# Patient Record
Sex: FEMALE | Race: WHITE | HISPANIC OR LATINO | Employment: OTHER | URBAN - METROPOLITAN AREA
[De-identification: names, ages, dates, MRNs, and addresses within clinical notes are randomized per-mention and may not be internally consistent; named-entity substitution may affect disease eponyms.]

---

## 2020-07-30 ENCOUNTER — APPOINTMENT (OUTPATIENT)
Dept: GENERAL RADIOLOGY | Facility: HOSPITAL | Age: 45
End: 2020-07-30

## 2020-07-30 ENCOUNTER — HOSPITAL ENCOUNTER (EMERGENCY)
Facility: HOSPITAL | Age: 45
Discharge: HOME | End: 2020-07-30
Attending: EMERGENCY MEDICINE | Admitting: EMERGENCY MEDICINE

## 2020-07-30 VITALS
HEIGHT: 63 IN | BODY MASS INDEX: 35.44 KG/M2 | TEMPERATURE: 99.7 F | HEART RATE: 93 BPM | WEIGHT: 200 LBS | SYSTOLIC BLOOD PRESSURE: 149 MMHG | RESPIRATION RATE: 22 BRPM | OXYGEN SATURATION: 95 % | DIASTOLIC BLOOD PRESSURE: 90 MMHG

## 2020-07-30 DIAGNOSIS — B34.8 RHINOVIRUS INFECTION: Primary | ICD-10-CM

## 2020-07-30 LAB
B PARAPERT DNA SPEC QL NAA+PROBE: NOT DETECTED
B PERT DNA SPEC QL NAA+PROBE: NOT DETECTED
C PNEUM DNA NPH QL NAA+NON-PROBE: NOT DETECTED
FLUAV H1 2009 PAND RNA NPH QL NAA+PROBE: NOT DETECTED
FLUAV H1 HA GENE NPH QL NAA+PROBE: NOT DETECTED
FLUAV H3 RNA NPH QL NAA+PROBE: NOT DETECTED
FLUAV SUBTYP SPEC NAA+PROBE: NOT DETECTED
FLUBV RNA ISLT QL NAA+PROBE: NOT DETECTED
HADV DNA SPEC NAA+PROBE: NOT DETECTED
HCOV 229E RNA SPEC QL NAA+PROBE: NOT DETECTED
HCOV HKU1 RNA SPEC QL NAA+PROBE: NOT DETECTED
HCOV NL63 RNA SPEC QL NAA+PROBE: NOT DETECTED
HCOV OC43 RNA SPEC QL NAA+PROBE: NOT DETECTED
HMPV RNA NPH QL NAA+NON-PROBE: NOT DETECTED
HPIV1 RNA SPEC QL NAA+PROBE: NOT DETECTED
HPIV2 RNA SPEC QL NAA+PROBE: NOT DETECTED
HPIV3 RNA NPH QL NAA+PROBE: NOT DETECTED
HPIV4 P GENE NPH QL NAA+PROBE: NOT DETECTED
M PNEUMO IGG SER IA-ACNC: NOT DETECTED
RHINOVIRUS RNA SPEC NAA+PROBE: DETECTED
RSV RNA NPH QL NAA+NON-PROBE: NOT DETECTED
SARS-COV-2 RNA NPH QL NAA+NON-PROBE: NOT DETECTED

## 2020-07-30 PROCEDURE — 0202U NFCT DS 22 TRGT SARS-COV-2: CPT | Performed by: PHYSICIAN ASSISTANT

## 2020-07-30 PROCEDURE — 99283 EMERGENCY DEPT VISIT LOW MDM: CPT

## 2020-07-30 PROCEDURE — 71045 X-RAY EXAM CHEST 1 VIEW: CPT

## 2020-07-30 RX ORDER — TIZANIDINE 4 MG/1
TABLET ORAL
COMMUNITY
Start: 2018-11-04

## 2020-07-30 RX ORDER — TADALAFIL 20 MG/1
40 TABLET ORAL
COMMUNITY

## 2020-07-30 RX ORDER — IBUPROFEN 600 MG/1
600 TABLET ORAL
COMMUNITY

## 2020-07-30 RX ORDER — BENZONATATE 100 MG/1
100-200 CAPSULE ORAL EVERY 8 HOURS PRN
Qty: 30 CAPSULE | Refills: 0 | Status: SHIPPED | OUTPATIENT
Start: 2020-07-30 | End: 2022-06-27 | Stop reason: SDUPTHER

## 2020-07-30 RX ORDER — PANTOPRAZOLE SODIUM 40 MG/1
40 TABLET, DELAYED RELEASE ORAL DAILY
COMMUNITY
Start: 2015-09-03

## 2020-07-30 RX ORDER — HYDROCODONE BITARTRATE AND ACETAMINOPHEN 7.5; 325 MG/1; MG/1
1 TABLET ORAL
COMMUNITY

## 2020-07-30 RX ORDER — FERROUS SULFATE 325(65) MG
325 TABLET ORAL DAILY
COMMUNITY

## 2020-07-31 ENCOUNTER — PATIENT OUTREACH (OUTPATIENT)
Dept: CASE MANAGEMENT | Facility: OTHER | Age: 45
End: 2020-07-31

## 2020-07-31 NOTE — OUTREACH NOTE
"ED Potential Covid Discharge Follow-up    Patient reports she is still feeling \"bad\" but slightly better than she felt yesterday. She has not taken her temperature today \"I don't feel hot anymore\" Pt. Continues to have a cough and has not picked up her new prescription for Tessalon pearls. Education done on need to continue to take her temperature and record, stay hydrated and rest. Pt. Knows to call PCP for f/u visit or any needs. Pt. Is aware she is positive for rhino virus and precautions to avoid contact with others. Reviewed CDC COVID-19 recommendations for quarantine and social distancing. Patient knows to wear a mask when leaving their home. Patient has no barriers to food and transportation at this time.Explained role of Care Advisor and contact information given to patient.Patient given phone number to the 24/7 Nurse Line ([phone]) & COVID-19 Hotline ([phone])       Sravanthi Deng RN  Ambulatory     7/31/2020, 12:41      "

## 2020-07-31 NOTE — OUTREACH NOTE
Care Coordination Note    Notified Dr. Tidwell's office of patient being + for rhinovirus and negative for Covid 19. Spoke with Sonia.     Sravanthi Deng RN  Ambulatory     7/31/2020, 12:45

## 2020-08-03 ENCOUNTER — EPISODE CHANGES (OUTPATIENT)
Dept: CASE MANAGEMENT | Facility: OTHER | Age: 45
End: 2020-08-03

## 2021-02-17 ENCOUNTER — APPOINTMENT (OUTPATIENT)
Dept: CT IMAGING | Facility: HOSPITAL | Age: 46
End: 2021-02-17

## 2021-02-17 ENCOUNTER — HOSPITAL ENCOUNTER (EMERGENCY)
Facility: HOSPITAL | Age: 46
Discharge: HOME | End: 2021-02-17
Attending: EMERGENCY MEDICINE | Admitting: EMERGENCY MEDICINE

## 2021-02-17 VITALS
RESPIRATION RATE: 16 BRPM | HEART RATE: 86 BPM | BODY MASS INDEX: 35.26 KG/M2 | WEIGHT: 199 LBS | OXYGEN SATURATION: 93 % | TEMPERATURE: 97.3 F | SYSTOLIC BLOOD PRESSURE: 130 MMHG | HEIGHT: 63 IN | DIASTOLIC BLOOD PRESSURE: 79 MMHG

## 2021-02-17 DIAGNOSIS — R04.2 HEMOPTYSIS: Primary | ICD-10-CM

## 2021-02-17 DIAGNOSIS — R04.89 PULMONARY ALVEOLAR HEMORRHAGE: ICD-10-CM

## 2021-02-17 DIAGNOSIS — Z86.79 HISTORY OF PULMONARY HYPERTENSION: ICD-10-CM

## 2021-02-17 LAB
ALBUMIN SERPL-MCNC: 4.2 G/DL (ref 3.5–5.2)
ALBUMIN/GLOB SERPL: 1.3 G/DL
ALP SERPL-CCNC: 53 U/L (ref 39–117)
ALT SERPL W P-5'-P-CCNC: 13 U/L (ref 1–33)
ANION GAP SERPL CALCULATED.3IONS-SCNC: 10 MMOL/L (ref 5–15)
APTT PPP: 29.1 SECONDS (ref 22.7–35.4)
AST SERPL-CCNC: 10 U/L (ref 1–32)
BASOPHILS # BLD AUTO: 0.03 10*3/MM3 (ref 0–0.2)
BASOPHILS NFR BLD AUTO: 0.6 % (ref 0–1.5)
BILIRUB SERPL-MCNC: 0.4 MG/DL (ref 0–1.2)
BUN SERPL-MCNC: 13 MG/DL (ref 6–20)
BUN/CREAT SERPL: 15.9 (ref 7–25)
CALCIUM SPEC-SCNC: 9 MG/DL (ref 8.6–10.5)
CHLORIDE SERPL-SCNC: 104 MMOL/L (ref 98–107)
CO2 SERPL-SCNC: 23 MMOL/L (ref 22–29)
CREAT SERPL-MCNC: 0.82 MG/DL (ref 0.57–1)
DEPRECATED RDW RBC AUTO: 40.2 FL (ref 37–54)
EOSINOPHIL # BLD AUTO: 0.08 10*3/MM3 (ref 0–0.4)
EOSINOPHIL NFR BLD AUTO: 1.5 % (ref 0.3–6.2)
ERYTHROCYTE [DISTWIDTH] IN BLOOD BY AUTOMATED COUNT: 13.1 % (ref 12.3–15.4)
GFR SERPL CREATININE-BSD FRML MDRD: 76 ML/MIN/1.73
GLOBULIN UR ELPH-MCNC: 3.2 GM/DL
GLUCOSE SERPL-MCNC: 98 MG/DL (ref 65–99)
HCG SERPL QL: NEGATIVE
HCT VFR BLD AUTO: 37.6 % (ref 34–46.6)
HGB BLD-MCNC: 13.3 G/DL (ref 12–15.9)
IMM GRANULOCYTES # BLD AUTO: 0.01 10*3/MM3 (ref 0–0.05)
IMM GRANULOCYTES NFR BLD AUTO: 0.2 % (ref 0–0.5)
INR PPP: 0.96 (ref 0.9–1.1)
LYMPHOCYTES # BLD AUTO: 1.72 10*3/MM3 (ref 0.7–3.1)
LYMPHOCYTES NFR BLD AUTO: 32.8 % (ref 19.6–45.3)
MCH RBC QN AUTO: 30.4 PG (ref 26.6–33)
MCHC RBC AUTO-ENTMCNC: 35.4 G/DL (ref 31.5–35.7)
MCV RBC AUTO: 86 FL (ref 79–97)
MONOCYTES # BLD AUTO: 0.32 10*3/MM3 (ref 0.1–0.9)
MONOCYTES NFR BLD AUTO: 6.1 % (ref 5–12)
NEUTROPHILS NFR BLD AUTO: 3.09 10*3/MM3 (ref 1.7–7)
NEUTROPHILS NFR BLD AUTO: 58.8 % (ref 42.7–76)
NRBC BLD AUTO-RTO: 0 /100 WBC (ref 0–0.2)
PLATELET # BLD AUTO: 185 10*3/MM3 (ref 140–450)
PMV BLD AUTO: 11.1 FL (ref 6–12)
POTASSIUM SERPL-SCNC: 3.6 MMOL/L (ref 3.5–5.2)
PROT SERPL-MCNC: 7.4 G/DL (ref 6–8.5)
PROTHROMBIN TIME: 12.6 SECONDS (ref 11.7–14.2)
RBC # BLD AUTO: 4.37 10*6/MM3 (ref 3.77–5.28)
SODIUM SERPL-SCNC: 137 MMOL/L (ref 136–145)
WBC # BLD AUTO: 5.25 10*3/MM3 (ref 3.4–10.8)

## 2021-02-17 PROCEDURE — 85730 THROMBOPLASTIN TIME PARTIAL: CPT | Performed by: EMERGENCY MEDICINE

## 2021-02-17 PROCEDURE — 80053 COMPREHEN METABOLIC PANEL: CPT | Performed by: EMERGENCY MEDICINE

## 2021-02-17 PROCEDURE — 85025 COMPLETE CBC W/AUTO DIFF WBC: CPT | Performed by: EMERGENCY MEDICINE

## 2021-02-17 PROCEDURE — 85610 PROTHROMBIN TIME: CPT | Performed by: EMERGENCY MEDICINE

## 2021-02-17 PROCEDURE — 25010000002 IOPAMIDOL 61 % SOLUTION: Performed by: EMERGENCY MEDICINE

## 2021-02-17 PROCEDURE — 71260 CT THORAX DX C+: CPT

## 2021-02-17 PROCEDURE — 84703 CHORIONIC GONADOTROPIN ASSAY: CPT | Performed by: EMERGENCY MEDICINE

## 2021-02-17 PROCEDURE — 99283 EMERGENCY DEPT VISIT LOW MDM: CPT

## 2021-02-17 RX ADMIN — IOPAMIDOL 100 ML: 612 INJECTION, SOLUTION INTRAVENOUS at 21:04

## 2021-02-17 NOTE — ED NOTES
Pt states she has been coughing up blood.    Denies blood thinners.  Mask placed on patient in triage.  Triage RN wearing mask throughout encounter.       Juaquin Patricia, RN  02/17/21 [id number]

## 2021-02-18 NOTE — ED NOTES
"Patient notes last night she coughed up blood, that was dark red and small 1/2 dime in size.  Patient states \"after I coughed up last night, I felt so much better, but then it happened again today so I got really concerned and came in.\"  Denies any blood thinners, notes hx of pulmonary edema and shortness of breath is normal for her to have.  Patient is anxious and tearful but cooperative with care.  ABC's intact, VSS.  NAD noted.  Patient wearing mask, nurse wearing mask and protective eyewear during care and assessment.  Hand hygiene performed prior to and post care.      Juan A Clemente RN  02/17/21 1948    "

## 2021-02-18 NOTE — ED NOTES
Stuck patient x2 for IV and twice for blood.  Unable to obtain blood or IV.  IV therapy paged at 1944.     Juan A Clemente RN  02/17/21 1946

## 2021-02-18 NOTE — ED PROVIDER NOTES
EMERGENCY DEPARTMENT ENCOUNTER    Room Number:    Date of encounter:  2021  PCP: Rm Tidwell MD  Historian: Patient      HPI:  Chief Complaint: Hemoptysis  A complete HPI/ROS/PMH/PSH/SH/FH are unobtainable due to: None    Context: Cleo Menon is a 45 y.o. female who presents to the ED via private vehicle for two episodes of coughing up blood, first one came last night, second one was today.  Denies any dizziness, hands, shortness breath, chest pain, nausea, vomiting or diarrhea.  Reports feeling completely normal at this time.  She reports a history of pulmonary hypertension with normal oxygen saturations in the low 90s on room air at home.  This is normal for her.  Has never had any hemoptysis before.  Denies any recent travel or recent surgery, denies any recent illnesses including no fevers, chills, sinusitis.      MEDICAL RECORD REVIEW    Chart review in epic shows no evidence of anticoagulation.    PAST MEDICAL HISTORY  Active Ambulatory Problems     Diagnosis Date Noted   • No Active Ambulatory Problems     Resolved Ambulatory Problems     Diagnosis Date Noted   • No Resolved Ambulatory Problems     Past Medical History:   Diagnosis Date   • Enlarged heart    • Hypertension    • Pulmonary hypertension (CMS/HCC)          PAST SURGICAL HISTORY  Past Surgical History:   Procedure Laterality Date   •  SECTION     • CHOLECYSTECTOMY     • LUMBAR SPINAL CORD TUMOR REMOVAL     • VSD REPAIR           FAMILY HISTORY  History reviewed. No pertinent family history.      SOCIAL HISTORY  Social History     Socioeconomic History   • Marital status:      Spouse name: Not on file   • Number of children: Not on file   • Years of education: Not on file   • Highest education level: Not on file   Tobacco Use   • Smoking status: Never Smoker   • Smokeless tobacco: Never Used   Substance and Sexual Activity   • Alcohol use: Never     Frequency: Never   • Drug use: Never   • Sexual activity: Defer          ALLERGIES  Patient has no known allergies.        REVIEW OF SYSTEMS  Review of Systems     All systems reviewed and negative except for those discussed in HPI.       PHYSICAL EXAM    I have reviewed the triage vital signs and nursing notes.    ED Triage Vitals   Temp Heart Rate Resp BP SpO2   02/17/21 1856 02/17/21 1856 02/17/21 1856 02/17/21 1903 02/17/21 1856   97.3 °F (36.3 °C) 108 18 173/95 95 %      Temp src Heart Rate Source Patient Position BP Location FiO2 (%)   02/17/21 1856 -- 02/17/21 1903 02/17/21 1903 --   Tympanic  Lying Right arm        Physical Exam  General: Awake, alert, no acute distress  HEENT: Mucous membranes moist, atraumatic, EOMI, no blood in oropharynx or at the nares  Neck: Full ROM  Pulm: Symmetric chest rise, nonlabored, lungs CTAB  Cardiovascular: Regular rate and rhythm, intact distal pulses  GI: Soft, nontender, nondistended, no rebound, no guarding, bowel sounds present  MSK: Full ROM, no deformity  Skin: Warm, dry  Neuro: Alert and oriented x 3, GCS 15, moving all extremities, no focal deficits  Psych: Calm, cooperative      Surgical mask, protective eye goggles, and gloves used during this encounter. Patient in surgical mask.      LAB RESULTS  Recent Results (from the past 24 hour(s))   Comprehensive Metabolic Panel    Collection Time: 02/17/21  8:02 PM    Specimen: Blood   Result Value Ref Range    Glucose 98 65 - 99 mg/dL    BUN 13 6 - 20 mg/dL    Creatinine 0.82 0.57 - 1.00 mg/dL    Sodium 137 136 - 145 mmol/L    Potassium 3.6 3.5 - 5.2 mmol/L    Chloride 104 98 - 107 mmol/L    CO2 23.0 22.0 - 29.0 mmol/L    Calcium 9.0 8.6 - 10.5 mg/dL    Total Protein 7.4 6.0 - 8.5 g/dL    Albumin 4.20 3.50 - 5.20 g/dL    ALT (SGPT) 13 1 - 33 U/L    AST (SGOT) 10 1 - 32 U/L    Alkaline Phosphatase 53 39 - 117 U/L    Total Bilirubin 0.4 0.0 - 1.2 mg/dL    eGFR Non African Amer 76 >60 mL/min/1.73    Globulin 3.2 gm/dL    A/G Ratio 1.3 g/dL    BUN/Creatinine Ratio 15.9 7.0 - 25.0    Anion  Gap 10.0 5.0 - 15.0 mmol/L   Protime-INR    Collection Time: 02/17/21  8:02 PM    Specimen: Blood   Result Value Ref Range    Protime 12.6 11.7 - 14.2 Seconds    INR 0.96 0.90 - 1.10   aPTT    Collection Time: 02/17/21  8:02 PM    Specimen: Blood   Result Value Ref Range    PTT 29.1 22.7 - 35.4 seconds   CBC Auto Differential    Collection Time: 02/17/21  8:02 PM    Specimen: Blood   Result Value Ref Range    WBC 5.25 3.40 - 10.80 10*3/mm3    RBC 4.37 3.77 - 5.28 10*6/mm3    Hemoglobin 13.3 12.0 - 15.9 g/dL    Hematocrit 37.6 34.0 - 46.6 %    MCV 86.0 79.0 - 97.0 fL    MCH 30.4 26.6 - 33.0 pg    MCHC 35.4 31.5 - 35.7 g/dL    RDW 13.1 12.3 - 15.4 %    RDW-SD 40.2 37.0 - 54.0 fl    MPV 11.1 6.0 - 12.0 fL    Platelets 185 140 - 450 10*3/mm3    Neutrophil % 58.8 42.7 - 76.0 %    Lymphocyte % 32.8 19.6 - 45.3 %    Monocyte % 6.1 5.0 - 12.0 %    Eosinophil % 1.5 0.3 - 6.2 %    Basophil % 0.6 0.0 - 1.5 %    Immature Grans % 0.2 0.0 - 0.5 %    Neutrophils, Absolute 3.09 1.70 - 7.00 10*3/mm3    Lymphocytes, Absolute 1.72 0.70 - 3.10 10*3/mm3    Monocytes, Absolute 0.32 0.10 - 0.90 10*3/mm3    Eosinophils, Absolute 0.08 0.00 - 0.40 10*3/mm3    Basophils, Absolute 0.03 0.00 - 0.20 10*3/mm3    Immature Grans, Absolute 0.01 0.00 - 0.05 10*3/mm3    nRBC 0.0 0.0 - 0.2 /100 WBC   hCG, Serum, Qualitative    Collection Time: 02/17/21  8:02 PM    Specimen: Blood   Result Value Ref Range    HCG Qualitative Negative Negative       Ordered the above labs and independently reviewed the results.        RADIOLOGY  Ct Chest With Contrast Diagnostic    Result Date: 2/17/2021  Patient: JEFF MCCAULEY  Time Out: 21:55 Exam(s): CT CHEST With Contrast EXAM:   CT Chest With Intravenous Contrast CLINICAL HISTORY:    Reason for exam: hemoptysis. TECHNIQUE:   Axial computed tomography images of the chest with intravenous contrast.   CTDI is 22.2 mGy and DLP is 945.0 mGy-cm.  This CT exam was performed according to the principle of ALARA (As Low As  Reasonably Achievable) by using one or more of the following dose reduction techniques: automated exposure control, adjustment of the mA and or kV according to patient size, and or use of iterative reconstruction technique. COMPARISON:   No relevant prior studies available. FINDINGS:   Lungs:  Focal area of groundglass opacity within the right upper lobe which may be infectious.  Diffuse scattered pulmonary nodules all measuring less than 6 mm.   Pleural space:  Unremarkable.  No pneumothorax.  No significant effusion.   Heart:  Heart is enlarged.  No significant pericardial effusion.   Mediastinum:  Subcentimeter mediastinal and hilar lymph nodes, likely reactive.   Bones joints:  Unremarkable.  No acute fracture.  No dislocation.   Soft tissues:  Unremarkable.   Vasculature:  Enlarged main pulmonary artery measuring up to 4.4 cm which can be seen setting of prominent arterial hypertension.  No pulmonary embolus.  No thoracic aortic dissection or aneurysm.   Lymph nodes:  Unremarkable.   Liver:  Hepatic steatosis.   Gallbladder and bile ducts:  Gallbladder is surgically absent.   Kidneys and ureters:  Probable cyst within the right kidney. IMPRESSION:     1.  Enlarged main pulmonary artery measuring up to 4.4 cm which can be seen setting of prominent arterial hypertension.  No pulmonary embolus. 2.  No thoracic aortic dissection or aneurysm. 3.  Focal area of groundglass opacity within the right upper lobe which may be infectious.  Consider short interval follow-up after appropriate treatment.     Electronically signed by Akira Chun MD on 02-17-21 at 2155      I ordered the above noted radiological studies. Reviewed by me. See dictation for official radiology interpretation.      PROCEDURES    Procedures      MEDICATIONS GIVEN IN ER    Medications   iopamidol (ISOVUE-300) 61 % injection 100 mL (100 mL Intravenous Given by Other 2/17/21 4116)         PROGRESS, DATA ANALYSIS, CONSULTS, AND MEDICAL DECISION  MAKING    All labs have been independently reviewed by me.  All radiology studies have been reviewed by me and discussed with radiologist dictating the report.   EKG's independently viewed and interpreted by me.  Discussion below represents my analysis of pertinent findings related to patient's condition, differential diagnosis, treatment plan and final disposition.    Initial concern for possible lung mass, pneumonia, bronchitis, among others.    ED Course as of Feb 17 2232 Wed Feb 17, 2021 2017 WBC: 5.25 [DC]   2017 Hemoglobin: 13.3 [DC]   2220 Pending Pulmonary consult to discuss ground glass opacities in RUL with hemoptysis and pulmonary hypertension, discuss need or not for bronchoscopy    [DC]   2230 Discussed case with Dr. Kinsey, pulmonary, he has reviewed patient's labs and imaging findings and does not feel like she needs urgent hospitalization for bronchoscopy.  There is a small area of opacity, likely a small alveolar hemorrhage but at this time patient is completely stable with normal blood work and and in no distress without any symptoms.  She has scheduled follow-up with her pulmonary physicians on Tuesday, he feels it is safe for discharge with close follow-up then.  She has been given strict return precautions for any worsening of symptoms.    [DC]      ED Course User Index  [DC] Brock Gerard MD       AS OF 22:32 EST VITALS:    BP - 130/79  HR - 86  TEMP - 97.3 °F (36.3 °C) (Tympanic)  02 SATS - 93%        DIAGNOSIS  Final diagnoses:   Hemoptysis   History of pulmonary hypertension   Pulmonary alveolar hemorrhage         DISPOSITION  DISCHARGE    Patient discharged in stable condition.    Reviewed implications of results, diagnosis, meds, responsibility to follow up, warning signs and symptoms of possible worsening, potential complications and reasons to return to ER.    Patient/Family voiced understanding of above instructions.    Discussed plan for discharge, as there is no emergent  indication for admission. Patient referred to primary care provider for BP management due to today's BP. Pt/family is agreeable and understands need for follow up and repeat testing.  Pt is aware that discharge does not mean that nothing is wrong but it indicates no emergency is present that requires admission and they must continue care with follow-up as given below or physician of their choice.     FOLLOW-UP  [facility] Emergency Department  [address]  [facility] [address] [phone]    As needed, If symptoms worsen    Rm Tidwell MD  [address] [facility] [id number] [phone]    Schedule an appointment as soon as possible for a visit   for [facility] PULMON[facility]ARE  [address], [address]  [facility] [id number] [phone]  Call on 2/18/2021  to set up close follow up         Medication List      No changes were made to your prescriptions during this visit.                    Brock Gerard MD  02/17/21 3997

## 2021-02-18 NOTE — DISCHARGE INSTRUCTIONS
Continue current medications, follow-up with your PCP and pulmonary physicians as discussed, ED return for worsening symptoms as needed.

## 2022-06-27 ENCOUNTER — APPOINTMENT (OUTPATIENT)
Dept: GENERAL RADIOLOGY | Facility: HOSPITAL | Age: 47
End: 2022-06-27

## 2022-06-27 ENCOUNTER — HOSPITAL ENCOUNTER (EMERGENCY)
Facility: HOSPITAL | Age: 47
Discharge: HOME | End: 2022-06-27
Attending: EMERGENCY MEDICINE | Admitting: EMERGENCY MEDICINE

## 2022-06-27 VITALS
SYSTOLIC BLOOD PRESSURE: 130 MMHG | OXYGEN SATURATION: 92 % | TEMPERATURE: 99.1 F | HEART RATE: 76 BPM | RESPIRATION RATE: 18 BRPM | DIASTOLIC BLOOD PRESSURE: 63 MMHG

## 2022-06-27 DIAGNOSIS — B34.8 INFECTION DUE TO HUMAN METAPNEUMOVIRUS (HMPV): Primary | ICD-10-CM

## 2022-06-27 DIAGNOSIS — M79.10 MYALGIA: ICD-10-CM

## 2022-06-27 DIAGNOSIS — R50.9 FEVER IN ADULT: ICD-10-CM

## 2022-06-27 DIAGNOSIS — R05.9 COUGH: ICD-10-CM

## 2022-06-27 LAB
ALBUMIN SERPL-MCNC: 4.7 G/DL (ref 3.5–5.2)
ALBUMIN/GLOB SERPL: 1.5 G/DL
ALP SERPL-CCNC: 73 U/L (ref 39–117)
ALT SERPL W P-5'-P-CCNC: 10 U/L (ref 1–33)
ANION GAP SERPL CALCULATED.3IONS-SCNC: 15.3 MMOL/L (ref 5–15)
AST SERPL-CCNC: 13 U/L (ref 1–32)
B PARAPERT DNA SPEC QL NAA+PROBE: NOT DETECTED
B PERT DNA SPEC QL NAA+PROBE: NOT DETECTED
BACTERIA UR QL AUTO: NORMAL /HPF
BASOPHILS # BLD AUTO: 0.01 10*3/MM3 (ref 0–0.2)
BASOPHILS NFR BLD AUTO: 0.3 % (ref 0–1.5)
BILIRUB SERPL-MCNC: 0.6 MG/DL (ref 0–1.2)
BILIRUB UR QL STRIP: NEGATIVE
BUN SERPL-MCNC: 8 MG/DL (ref 6–20)
BUN/CREAT SERPL: 9.5 (ref 7–25)
C PNEUM DNA NPH QL NAA+NON-PROBE: NOT DETECTED
CALCIUM SPEC-SCNC: 10 MG/DL (ref 8.6–10.5)
CHLORIDE SERPL-SCNC: 101 MMOL/L (ref 98–107)
CLARITY UR: CLEAR
CO2 SERPL-SCNC: 19.7 MMOL/L (ref 22–29)
COLOR UR: YELLOW
CREAT SERPL-MCNC: 0.84 MG/DL (ref 0.57–1)
DEPRECATED RDW RBC AUTO: 41.9 FL (ref 37–54)
EGFRCR SERPLBLD CKD-EPI 2021: 87.5 ML/MIN/1.73
EOSINOPHIL # BLD AUTO: 0.02 10*3/MM3 (ref 0–0.4)
EOSINOPHIL NFR BLD AUTO: 0.5 % (ref 0.3–6.2)
ERYTHROCYTE [DISTWIDTH] IN BLOOD BY AUTOMATED COUNT: 13 % (ref 12.3–15.4)
FLUAV SUBTYP SPEC NAA+PROBE: NOT DETECTED
FLUBV RNA ISLT QL NAA+PROBE: NOT DETECTED
GLOBULIN UR ELPH-MCNC: 3.2 GM/DL
GLUCOSE SERPL-MCNC: 92 MG/DL (ref 65–99)
GLUCOSE UR STRIP-MCNC: NEGATIVE MG/DL
HADV DNA SPEC NAA+PROBE: NOT DETECTED
HCOV 229E RNA SPEC QL NAA+PROBE: NOT DETECTED
HCOV HKU1 RNA SPEC QL NAA+PROBE: NOT DETECTED
HCOV NL63 RNA SPEC QL NAA+PROBE: NOT DETECTED
HCOV OC43 RNA SPEC QL NAA+PROBE: NOT DETECTED
HCT VFR BLD AUTO: 40.2 % (ref 34–46.6)
HGB BLD-MCNC: 13.3 G/DL (ref 12–15.9)
HGB UR QL STRIP.AUTO: ABNORMAL
HMPV RNA NPH QL NAA+NON-PROBE: DETECTED
HOLD SPECIMEN: NORMAL
HPIV1 RNA ISLT QL NAA+PROBE: NOT DETECTED
HPIV2 RNA SPEC QL NAA+PROBE: NOT DETECTED
HPIV3 RNA NPH QL NAA+PROBE: NOT DETECTED
HPIV4 P GENE NPH QL NAA+PROBE: NOT DETECTED
HYALINE CASTS UR QL AUTO: NORMAL /LPF
IMM GRANULOCYTES # BLD AUTO: 0.01 10*3/MM3 (ref 0–0.05)
IMM GRANULOCYTES NFR BLD AUTO: 0.3 % (ref 0–0.5)
KETONES UR QL STRIP: NEGATIVE
LEUKOCYTE ESTERASE UR QL STRIP.AUTO: NEGATIVE
LYMPHOCYTES # BLD AUTO: 0.86 10*3/MM3 (ref 0.7–3.1)
LYMPHOCYTES NFR BLD AUTO: 23.6 % (ref 19.6–45.3)
M PNEUMO IGG SER IA-ACNC: NOT DETECTED
MCH RBC QN AUTO: 29.2 PG (ref 26.6–33)
MCHC RBC AUTO-ENTMCNC: 33.1 G/DL (ref 31.5–35.7)
MCV RBC AUTO: 88.2 FL (ref 79–97)
MONOCYTES # BLD AUTO: 0.48 10*3/MM3 (ref 0.1–0.9)
MONOCYTES NFR BLD AUTO: 13.2 % (ref 5–12)
NEUTROPHILS NFR BLD AUTO: 2.26 10*3/MM3 (ref 1.7–7)
NEUTROPHILS NFR BLD AUTO: 62.1 % (ref 42.7–76)
NITRITE UR QL STRIP: NEGATIVE
NRBC BLD AUTO-RTO: 0 /100 WBC (ref 0–0.2)
NT-PROBNP SERPL-MCNC: 587 PG/ML (ref 0–450)
PH UR STRIP.AUTO: 5.5 [PH] (ref 5–8)
PLATELET # BLD AUTO: 163 10*3/MM3 (ref 140–450)
PMV BLD AUTO: 11 FL (ref 6–12)
POTASSIUM SERPL-SCNC: 3.7 MMOL/L (ref 3.5–5.2)
PROT SERPL-MCNC: 7.9 G/DL (ref 6–8.5)
PROT UR QL STRIP: ABNORMAL
QT INTERVAL: 369 MS
RBC # BLD AUTO: 4.56 10*6/MM3 (ref 3.77–5.28)
RBC # UR STRIP: NORMAL /HPF
REF LAB TEST METHOD: NORMAL
RHINOVIRUS RNA SPEC NAA+PROBE: NOT DETECTED
RSV RNA NPH QL NAA+NON-PROBE: NOT DETECTED
SARS-COV-2 RNA NPH QL NAA+NON-PROBE: NOT DETECTED
SODIUM SERPL-SCNC: 136 MMOL/L (ref 136–145)
SP GR UR STRIP: 1.01 (ref 1–1.03)
SQUAMOUS #/AREA URNS HPF: NORMAL /HPF
TROPONIN T SERPL-MCNC: <0.01 NG/ML (ref 0–0.03)
UROBILINOGEN UR QL STRIP: ABNORMAL
WBC # UR STRIP: NORMAL /HPF
WBC NRBC COR # BLD: 3.64 10*3/MM3 (ref 3.4–10.8)
WHOLE BLOOD HOLD COAG: NORMAL
WHOLE BLOOD HOLD SPECIMEN: NORMAL

## 2022-06-27 PROCEDURE — 80053 COMPREHEN METABOLIC PANEL: CPT

## 2022-06-27 PROCEDURE — 0202U NFCT DS 22 TRGT SARS-COV-2: CPT

## 2022-06-27 PROCEDURE — 85025 COMPLETE CBC W/AUTO DIFF WBC: CPT

## 2022-06-27 PROCEDURE — 36415 COLL VENOUS BLD VENIPUNCTURE: CPT

## 2022-06-27 PROCEDURE — 93005 ELECTROCARDIOGRAM TRACING: CPT

## 2022-06-27 PROCEDURE — 83880 ASSAY OF NATRIURETIC PEPTIDE: CPT

## 2022-06-27 PROCEDURE — 93010 ELECTROCARDIOGRAM REPORT: CPT | Performed by: INTERNAL MEDICINE

## 2022-06-27 PROCEDURE — 71046 X-RAY EXAM CHEST 2 VIEWS: CPT

## 2022-06-27 PROCEDURE — 99284 EMERGENCY DEPT VISIT MOD MDM: CPT

## 2022-06-27 PROCEDURE — 81001 URINALYSIS AUTO W/SCOPE: CPT | Performed by: EMERGENCY MEDICINE

## 2022-06-27 PROCEDURE — 84484 ASSAY OF TROPONIN QUANT: CPT

## 2022-06-27 RX ORDER — BENZONATATE 100 MG/1
100 CAPSULE ORAL ONCE
Status: COMPLETED | OUTPATIENT
Start: 2022-06-27 | End: 2022-06-27

## 2022-06-27 RX ORDER — ASPIRIN 81 MG/1
324 TABLET, CHEWABLE ORAL ONCE
Status: COMPLETED | OUTPATIENT
Start: 2022-06-27 | End: 2022-06-27

## 2022-06-27 RX ORDER — BENZONATATE 100 MG/1
100-200 CAPSULE ORAL EVERY 8 HOURS PRN
Qty: 30 CAPSULE | Refills: 0 | Status: SHIPPED | OUTPATIENT
Start: 2022-06-27

## 2022-06-27 RX ORDER — SODIUM CHLORIDE 0.9 % (FLUSH) 0.9 %
10 SYRINGE (ML) INJECTION AS NEEDED
Status: DISCONTINUED | OUTPATIENT
Start: 2022-06-27 | End: 2022-06-27

## 2022-06-27 RX ADMIN — ASPIRIN 324 MG: 81 TABLET, CHEWABLE ORAL at 19:47

## 2022-06-27 RX ADMIN — BENZONATATE 100 MG: 100 CAPSULE ORAL at 20:54

## 2023-06-03 ENCOUNTER — APPOINTMENT (OUTPATIENT)
Dept: GENERAL RADIOLOGY | Facility: HOSPITAL | Age: 48
End: 2023-06-03
Payer: MEDICARE

## 2023-06-03 ENCOUNTER — TELEMEDICINE (OUTPATIENT)
Dept: FAMILY MEDICINE CLINIC | Facility: TELEHEALTH | Age: 48
End: 2023-06-03

## 2023-06-03 DIAGNOSIS — K59.00 CONSTIPATION, UNSPECIFIED CONSTIPATION TYPE: ICD-10-CM

## 2023-06-03 DIAGNOSIS — R06.02 SHORTNESS OF BREATH: Primary | ICD-10-CM

## 2023-06-03 DIAGNOSIS — Z87.898 HISTORY OF EXERTIONAL CHEST PAIN: ICD-10-CM

## 2023-06-03 LAB
BASOPHILS # BLD AUTO: 0.02 10*3/MM3 (ref 0–0.2)
BASOPHILS NFR BLD AUTO: 0.5 % (ref 0–1.5)
DEPRECATED RDW RBC AUTO: 41.2 FL (ref 37–54)
EOSINOPHIL # BLD AUTO: 0.09 10*3/MM3 (ref 0–0.4)
EOSINOPHIL NFR BLD AUTO: 2.1 % (ref 0.3–6.2)
ERYTHROCYTE [DISTWIDTH] IN BLOOD BY AUTOMATED COUNT: 13.3 % (ref 12.3–15.4)
HCT VFR BLD AUTO: 33.1 % (ref 34–46.6)
HGB BLD-MCNC: 11.2 G/DL (ref 12–15.9)
IMM GRANULOCYTES # BLD AUTO: 0.02 10*3/MM3 (ref 0–0.05)
IMM GRANULOCYTES NFR BLD AUTO: 0.5 % (ref 0–0.5)
LYMPHOCYTES # BLD AUTO: 1.08 10*3/MM3 (ref 0.7–3.1)
LYMPHOCYTES NFR BLD AUTO: 24.8 % (ref 19.6–45.3)
MCH RBC QN AUTO: 28.4 PG (ref 26.6–33)
MCHC RBC AUTO-ENTMCNC: 33.8 G/DL (ref 31.5–35.7)
MCV RBC AUTO: 84 FL (ref 79–97)
MONOCYTES # BLD AUTO: 0.28 10*3/MM3 (ref 0.1–0.9)
MONOCYTES NFR BLD AUTO: 6.4 % (ref 5–12)
NEUTROPHILS NFR BLD AUTO: 2.86 10*3/MM3 (ref 1.7–7)
NEUTROPHILS NFR BLD AUTO: 65.7 % (ref 42.7–76)
NRBC BLD AUTO-RTO: 0 /100 WBC (ref 0–0.2)
PLATELET # BLD AUTO: 178 10*3/MM3 (ref 140–450)
PMV BLD AUTO: 9.5 FL (ref 6–12)
RBC # BLD AUTO: 3.94 10*6/MM3 (ref 3.77–5.28)
WBC NRBC COR # BLD: 4.35 10*3/MM3 (ref 3.4–10.8)

## 2023-06-03 PROCEDURE — 84703 CHORIONIC GONADOTROPIN ASSAY: CPT | Performed by: EMERGENCY MEDICINE

## 2023-06-03 PROCEDURE — 93005 ELECTROCARDIOGRAM TRACING: CPT

## 2023-06-03 PROCEDURE — 93005 ELECTROCARDIOGRAM TRACING: CPT | Performed by: EMERGENCY MEDICINE

## 2023-06-03 PROCEDURE — 84484 ASSAY OF TROPONIN QUANT: CPT

## 2023-06-03 PROCEDURE — 36415 COLL VENOUS BLD VENIPUNCTURE: CPT

## 2023-06-03 PROCEDURE — 85025 COMPLETE CBC W/AUTO DIFF WBC: CPT

## 2023-06-03 PROCEDURE — 83880 ASSAY OF NATRIURETIC PEPTIDE: CPT

## 2023-06-03 PROCEDURE — 99284 EMERGENCY DEPT VISIT MOD MDM: CPT

## 2023-06-03 PROCEDURE — 80053 COMPREHEN METABOLIC PANEL: CPT

## 2023-06-03 RX ORDER — SODIUM CHLORIDE 0.9 % (FLUSH) 0.9 %
10 SYRINGE (ML) INJECTION AS NEEDED
Status: DISCONTINUED | OUTPATIENT
Start: 2023-06-03 | End: 2023-06-04 | Stop reason: HOSPADM

## 2023-06-04 ENCOUNTER — APPOINTMENT (OUTPATIENT)
Dept: GENERAL RADIOLOGY | Facility: HOSPITAL | Age: 48
End: 2023-06-04
Payer: MEDICARE

## 2023-06-04 ENCOUNTER — HOSPITAL ENCOUNTER (EMERGENCY)
Facility: HOSPITAL | Age: 48
Discharge: HOME | End: 2023-06-04
Attending: EMERGENCY MEDICINE | Admitting: EMERGENCY MEDICINE
Payer: MEDICARE

## 2023-06-04 ENCOUNTER — APPOINTMENT (OUTPATIENT)
Dept: CT IMAGING | Facility: HOSPITAL | Age: 48
End: 2023-06-04
Payer: MEDICARE

## 2023-06-04 VITALS
SYSTOLIC BLOOD PRESSURE: 125 MMHG | BODY MASS INDEX: 35.88 KG/M2 | RESPIRATION RATE: 19 BRPM | TEMPERATURE: 99.3 F | OXYGEN SATURATION: 96 % | HEIGHT: 62 IN | WEIGHT: 195 LBS | DIASTOLIC BLOOD PRESSURE: 76 MMHG | HEART RATE: 80 BPM

## 2023-06-04 DIAGNOSIS — J40 BRONCHITIS: ICD-10-CM

## 2023-06-04 DIAGNOSIS — K52.9 COLITIS: Primary | ICD-10-CM

## 2023-06-04 LAB
ALBUMIN SERPL-MCNC: 4.8 G/DL (ref 3.5–5.2)
ALBUMIN/GLOB SERPL: 1.6 G/DL
ALP SERPL-CCNC: 63 U/L (ref 39–117)
ALT SERPL W P-5'-P-CCNC: 22 U/L (ref 1–33)
ANION GAP SERPL CALCULATED.3IONS-SCNC: 12.6 MMOL/L (ref 5–15)
AST SERPL-CCNC: 21 U/L (ref 1–32)
BILIRUB SERPL-MCNC: 0.7 MG/DL (ref 0–1.2)
BILIRUB UR QL STRIP: NEGATIVE
BUN SERPL-MCNC: 7 MG/DL (ref 6–20)
BUN/CREAT SERPL: 8.9 (ref 7–25)
CALCIUM SPEC-SCNC: 9.6 MG/DL (ref 8.6–10.5)
CHLORIDE SERPL-SCNC: 106 MMOL/L (ref 98–107)
CLARITY UR: CLEAR
CO2 SERPL-SCNC: 24.4 MMOL/L (ref 22–29)
COLOR UR: YELLOW
CREAT SERPL-MCNC: 0.79 MG/DL (ref 0.57–1)
EGFRCR SERPLBLD CKD-EPI 2021: 93.6 ML/MIN/1.73
GLOBULIN UR ELPH-MCNC: 3 GM/DL
GLUCOSE SERPL-MCNC: 105 MG/DL (ref 65–99)
GLUCOSE UR STRIP-MCNC: NEGATIVE MG/DL
HCG SERPL QL: NEGATIVE
HGB UR QL STRIP.AUTO: NEGATIVE
HOLD SPECIMEN: NORMAL
HOLD SPECIMEN: NORMAL
KETONES UR QL STRIP: NEGATIVE
LEUKOCYTE ESTERASE UR QL STRIP.AUTO: NEGATIVE
NITRITE UR QL STRIP: NEGATIVE
NT-PROBNP SERPL-MCNC: 525 PG/ML (ref 0–450)
PH UR STRIP.AUTO: 7.5 [PH] (ref 5–8)
POTASSIUM SERPL-SCNC: 3.3 MMOL/L (ref 3.5–5.2)
PROT SERPL-MCNC: 7.8 G/DL (ref 6–8.5)
PROT UR QL STRIP: ABNORMAL
SODIUM SERPL-SCNC: 143 MMOL/L (ref 136–145)
SP GR UR STRIP: 1.01 (ref 1–1.03)
TROPONIN T SERPL HS-MCNC: 8 NG/L
UROBILINOGEN UR QL STRIP: ABNORMAL
WHOLE BLOOD HOLD COAG: NORMAL
WHOLE BLOOD HOLD SPECIMEN: NORMAL

## 2023-06-04 PROCEDURE — 71275 CT ANGIOGRAPHY CHEST: CPT

## 2023-06-04 PROCEDURE — 71046 X-RAY EXAM CHEST 2 VIEWS: CPT

## 2023-06-04 PROCEDURE — 96375 TX/PRO/DX INJ NEW DRUG ADDON: CPT

## 2023-06-04 PROCEDURE — 96376 TX/PRO/DX INJ SAME DRUG ADON: CPT

## 2023-06-04 PROCEDURE — 25010000002 MORPHINE PER 10 MG: Performed by: EMERGENCY MEDICINE

## 2023-06-04 PROCEDURE — 96374 THER/PROPH/DIAG INJ IV PUSH: CPT

## 2023-06-04 PROCEDURE — 74177 CT ABD & PELVIS W/CONTRAST: CPT

## 2023-06-04 PROCEDURE — 25510000001 IOPAMIDOL PER 1 ML: Performed by: EMERGENCY MEDICINE

## 2023-06-04 PROCEDURE — 25010000002 ONDANSETRON PER 1 MG: Performed by: EMERGENCY MEDICINE

## 2023-06-04 PROCEDURE — 81003 URINALYSIS AUTO W/O SCOPE: CPT | Performed by: EMERGENCY MEDICINE

## 2023-06-04 RX ORDER — AMOXICILLIN AND CLAVULANATE POTASSIUM 875; 125 MG/1; MG/1
1 TABLET, FILM COATED ORAL 2 TIMES DAILY
Qty: 14 TABLET | Refills: 0 | Status: SHIPPED | OUTPATIENT
Start: 2023-06-04 | End: 2023-06-10 | Stop reason: HOSPADM

## 2023-06-04 RX ORDER — ONDANSETRON 2 MG/ML
4 INJECTION INTRAMUSCULAR; INTRAVENOUS ONCE
Status: COMPLETED | OUTPATIENT
Start: 2023-06-04 | End: 2023-06-04

## 2023-06-04 RX ORDER — MORPHINE SULFATE 2 MG/ML
4 INJECTION, SOLUTION INTRAMUSCULAR; INTRAVENOUS ONCE
Status: COMPLETED | OUTPATIENT
Start: 2023-06-04 | End: 2023-06-04

## 2023-06-04 RX ORDER — AMOXICILLIN AND CLAVULANATE POTASSIUM 875; 125 MG/1; MG/1
1 TABLET, FILM COATED ORAL EVERY 12 HOURS
Qty: 14 TABLET | Refills: 0 | Status: CANCELLED | OUTPATIENT
Start: 2023-06-04

## 2023-06-04 RX ADMIN — MORPHINE SULFATE 4 MG: 2 INJECTION, SOLUTION INTRAMUSCULAR; INTRAVENOUS at 06:12

## 2023-06-04 RX ADMIN — MORPHINE SULFATE 4 MG: 2 INJECTION, SOLUTION INTRAMUSCULAR; INTRAVENOUS at 02:08

## 2023-06-04 RX ADMIN — IOPAMIDOL 95 ML: 755 INJECTION, SOLUTION INTRAVENOUS at 03:02

## 2023-06-04 RX ADMIN — ONDANSETRON 4 MG: 2 INJECTION INTRAMUSCULAR; INTRAVENOUS at 02:07

## 2023-06-04 NOTE — ED PROVIDER NOTES
EMERGENCY DEPARTMENT ENCOUNTER    Room Number:  06/06  Date of encounter:  6/4/2023  PCP: Rm Tidwell MD  Historian: Patient  Chronic or social conditions impacting care (social determinants of health): Nothing      I used full protective equipment while examining this patient.  This includes face mask, gloves and protective eyewear.  I washed my hands before entering the room and immediately upon leaving the room      HPI:  Chief Complaint: Abdominal pain, shortness of breath  A complete HPI/ROS/PMH/PSH/SH/FH are unobtainable due to: Nothing    Context: Cleo Menon is a 48 y.o. female who presents to the ED c/o multiple complaints.  She states over the past 4 days she has had some generalized abdominal pain and cramping.  She also reports constipation and nausea.  There are no precipitating or alleviating factors.  She has not had much of an appetite.  She denies any dysuria, fever.    In addition patient complains of approximate 4-day history of exertional shortness of breath, weakness, palpitations.  Patient noticed these symptoms while in [place].  She had difficulty walking.  She denies any hemoptysis.  She does have a history of pulmonary hypertension, VSD.  She is not anticoagulated.  She follows with the [facility] pulmonary hypertension clinic.    Review of prior external notes (non-ED):   I reviewed cardiology office visit from 1/17/2023.  Patient being followed for pulmonary hypertension, exertional shortness of breath.    Review of prior external test results outside of this encounter:  I reviewed CMP from 2/8/2023.  This showed potassium 3.5, creatinine 0.77.    PAST MEDICAL HISTORY  Active Ambulatory Problems     Diagnosis Date Noted   • No Active Ambulatory Problems     Resolved Ambulatory Problems     Diagnosis Date Noted   • No Resolved Ambulatory Problems     Past Medical History:   Diagnosis Date   • Enlarged heart    • Hypertension    • Pulmonary hypertension           PAST SURGICAL HISTORY  Past Surgical History:   Procedure Laterality Date   •  SECTION     • CHOLECYSTECTOMY     • LUMBAR SPINAL CORD TUMOR REMOVAL     • VSD REPAIR           FAMILY HISTORY  History reviewed. No pertinent family history.      SOCIAL HISTORY  Social History     Socioeconomic History   • Marital status: Significant Other   Tobacco Use   • Smoking status: Never   • Smokeless tobacco: Never   Substance and Sexual Activity   • Alcohol use: Never   • Drug use: Never   • Sexual activity: Defer         ALLERGIES  Patient has no known allergies.        REVIEW OF SYSTEMS  All systems reviewed and negative except for those discussed in HPI.       PHYSICAL EXAM    I have reviewed the triage vital signs and nursing notes.    ED Triage Vitals   Temp Heart Rate Resp BP SpO2   23 2340 23 2340 23   99.3 °F (37.4 °C) 102 22 133/70 90 %      Temp src Heart Rate Source Patient Position BP Location FiO2 (%)   23 2340 23 234 --   Tympanic Monitor Sitting Left arm        Physical Exam  GENERAL: Alert, oriented, nontoxic-appearing, not distressed  HENT: head atraumatic, no nuchal rigidity  EYES: no scleral icterus, EOMI  CV: regular rhythm, regular rate, no murmur  RESPIRATORY: normal effort, CTA  ABDOMEN: soft, mild diffuse abdominal tenderness without guarding or rebound.  MUSCULOSKELETAL: no deformity, FROM, no calf swelling or tenderness  NEURO: alert, moves all extremities, follows commands  SKIN: warm, dry        LAB RESULTS  Recent Results (from the past 24 hour(s))   ECG 12 Lead Chest Pain    Collection Time: 23 10:36 PM   Result Value Ref Range    QT Interval 350 ms   Comprehensive Metabolic Panel    Collection Time: 23 11:48 PM    Specimen: Blood   Result Value Ref Range    Glucose 105 (H) 65 - 99 mg/dL    BUN 7 6 - 20 mg/dL    Creatinine 0.79 0.57 - 1.00 mg/dL    Sodium 143 136 - 145 mmol/L     Potassium 3.3 (L) 3.5 - 5.2 mmol/L    Chloride 106 98 - 107 mmol/L    CO2 24.4 22.0 - 29.0 mmol/L    Calcium 9.6 8.6 - 10.5 mg/dL    Total Protein 7.8 6.0 - 8.5 g/dL    Albumin 4.8 3.5 - 5.2 g/dL    ALT (SGPT) 22 1 - 33 U/L    AST (SGOT) 21 1 - 32 U/L    Alkaline Phosphatase 63 39 - 117 U/L    Total Bilirubin 0.7 0.0 - 1.2 mg/dL    Globulin 3.0 gm/dL    A/G Ratio 1.6 g/dL    BUN/Creatinine Ratio 8.9 7.0 - 25.0    Anion Gap 12.6 5.0 - 15.0 mmol/L    eGFR 93.6 >60.0 mL/min/1.73   BNP    Collection Time: 06/03/23 11:48 PM    Specimen: Blood   Result Value Ref Range    proBNP 525.0 (H) 0.0 - 450.0 pg/mL   Single High Sensitivity Troponin T    Collection Time: 06/03/23 11:48 PM    Specimen: Blood   Result Value Ref Range    HS Troponin T 8 <10 ng/L   Green Top (Gel)    Collection Time: 06/03/23 11:48 PM   Result Value Ref Range    Extra Tube Hold for add-ons.    Lavender Top    Collection Time: 06/03/23 11:48 PM   Result Value Ref Range    Extra Tube hold for add-on    Gold Top - SST    Collection Time: 06/03/23 11:48 PM   Result Value Ref Range    Extra Tube Hold for add-ons.    Light Blue Top    Collection Time: 06/03/23 11:48 PM   Result Value Ref Range    Extra Tube Hold for add-ons.    CBC Auto Differential    Collection Time: 06/03/23 11:48 PM    Specimen: Blood   Result Value Ref Range    WBC 4.35 3.40 - 10.80 10*3/mm3    RBC 3.94 3.77 - 5.28 10*6/mm3    Hemoglobin 11.2 (L) 12.0 - 15.9 g/dL    Hematocrit 33.1 (L) 34.0 - 46.6 %    MCV 84.0 79.0 - 97.0 fL    MCH 28.4 26.6 - 33.0 pg    MCHC 33.8 31.5 - 35.7 g/dL    RDW 13.3 12.3 - 15.4 %    RDW-SD 41.2 37.0 - 54.0 fl    MPV 9.5 6.0 - 12.0 fL    Platelets 178 140 - 450 10*3/mm3    Neutrophil % 65.7 42.7 - 76.0 %    Lymphocyte % 24.8 19.6 - 45.3 %    Monocyte % 6.4 5.0 - 12.0 %    Eosinophil % 2.1 0.3 - 6.2 %    Basophil % 0.5 0.0 - 1.5 %    Immature Grans % 0.5 0.0 - 0.5 %    Neutrophils, Absolute 2.86 1.70 - 7.00 10*3/mm3    Lymphocytes, Absolute 1.08 0.70 - 3.10  10*3/mm3    Monocytes, Absolute 0.28 0.10 - 0.90 10*3/mm3    Eosinophils, Absolute 0.09 0.00 - 0.40 10*3/mm3    Basophils, Absolute 0.02 0.00 - 0.20 10*3/mm3    Immature Grans, Absolute 0.02 0.00 - 0.05 10*3/mm3    nRBC 0.0 0.0 - 0.2 /100 WBC   hCG, Serum, Qualitative    Collection Time: 06/03/23 11:48 PM    Specimen: Blood   Result Value Ref Range    HCG Qualitative Negative Negative   Urinalysis With Microscopic If Indicated (No Culture) - Urine, Clean Catch    Collection Time: 06/04/23  5:28 AM    Specimen: Urine, Clean Catch   Result Value Ref Range    Color, UA Yellow Yellow, Straw    Appearance, UA Clear Clear    pH, UA 7.5 5.0 - 8.0    Specific Gravity, UA 1.013 1.005 - 1.030    Glucose, UA Negative Negative    Ketones, UA Negative Negative    Bilirubin, UA Negative Negative    Blood, UA Negative Negative    Protein, UA Trace (A) Negative    Leuk Esterase, UA Negative Negative    Nitrite, UA Negative Negative    Urobilinogen, UA 0.2 E.U./dL 0.2 - 1.0 E.U./dL       Ordered the above labs and independently reviewed the results.        RADIOLOGY  XR Chest 2 View    Result Date: 6/4/2023  EXAMINATION: 2 VIEWS OF THE CHEST  HISTORY: 48-year-old female with a history of shortness of air.  FINDINGS: PA and lateral chest radiographs were obtained. Comparison is made to a chest radiograph dated 06/27/2022. There is a hazy appearance of the interstitial markings in the right mid and lower lung fields and to a lesser degree in the left mid and lower lung field. The cardiac silhouette is enlarged. Midline sternal wires are appreciated. No evidence for a pleural effusion is appreciated.      There is imaging evidence suggestive of mild bilateral interstitial edema and/or pneumonia. Cardiomegaly is noted.  This report was finalized on 6/4/2023 1:21 AM by Dr. Jaspal Dasilva M.D.      CT Abdomen Pelvis With Contrast    Result Date: 6/4/2023  Patient: JEFF MCCAULEY  Time Out: 05:16 Exam(s): CT ABDOMEN + PELVIS With  Contrast EXAM:   CT Abdomen and Pelvis With Intravenous Contrast CLINICAL HISTORY:    Reason for exam: abd pain, constipation. TECHNIQUE:   Axial computed tomography images of the abdomen and pelvis with intravenous contrast.  This CT exam was performed according to the principle of ALARA (As Low As Reasonably Achievable) by using one or more of the following dose reduction techniques: automated exposure control, adjustment of the mA and or kV according to patient size, and or use of iterative reconstruction technique. COMPARISON:   No relevant prior studies available. FINDINGS:   Lung bases: Findings concerning for bronchitis with pneumonitis with prominent centrilobular nodules at the right lung base.  Moderate cardiomegaly.  No mass.  No consolidation.  ABDOMEN:   Liver: Hepatic steatosis.  No mass.   Gallbladder and bile ducts: Status post cholecystectomy.  No ductal dilation.   Pancreas:  Unremarkable.  No mass.  No ductal dilation.   Spleen: Splenomegaly.   Adrenals:  Unremarkable.  No mass.   Kidneys and ureters: Bilateral renal cyst.  No solid mass.  No hydronephrosis.   Stomach and bowel: There is thickening and edema throughout the colon wall in addition to the rectal wall.  Diverticulosis of the descending and sigmoid colon.  PELVIS:   Appendix:  No findings to suggest acute appendicitis.   Bladder: There is thickening of the urinary bladder wall.  No mass.   Reproductive: There is a metallic IUD in place.  There is a large cystic left ovarian lesion measuring 90 x 47 x 45 mm.  ABDOMEN and PELVIS:   Intraperitoneal space:  Unremarkable.  No free air.  No significant fluid collection.   Bones joints: Moderate disc degeneration at L5-S1.  No acute fracture.  No dislocation.   Soft tissues:  Unremarkable.   Vasculature:  Unremarkable.  No abdominal aortic aneurysm.   Lymph nodes: There are numerous prominent lymph nodes scattered throughout the mesenteric fat. IMPRESSION:     Findings concerning for  pancolitis and proctitis, which may be of infectious or inflammatory etiologies.  Secondary mesenteric adenitis. There is thickening of the urinary bladder wall, which can be seen with cystitis.  Recommend correlation with UA. There is a large left ovarian cystic lesion measuring 90 x 47 x 45 mm.  Recommend MRI of the pelvis with and without contrast for further evaluation. Hepatic steatosis. Splenomegaly.     Electronically signed by Bhavya Jackson MD on 06-04-23 at 0516    CT Angiogram Chest    Result Date: 6/4/2023  Patient: JEFF MCCAULEY  Time Out: 05:27 Exam(s): CTA CHEST EXAM:   CT Angiography Chest With Intravenous Contrast CLINICAL HISTORY:    Reason for exam: SOA,CP. TECHNIQUE:   Axial computed tomographic angiography images of the chest with intravenous contrast.  This CT exam was performed according to the principle of ALARA (As Low As Reasonably Achievable) by using one or more of the following dose reduction techniques: automated exposure control, adjustment of the mA and or kV according to patient size, and or use of iterative reconstruction technique.   MIP reconstructed images were created and reviewed. COMPARISON:   No relevant prior studies available. FINDINGS:   Pulmonary arteries: Enlarged main pulmonary artery.  No pulmonary embolism.   Aorta:  No acute findings.  No thoracic aortic aneurysm.   Lungs: Bronchitis with pneumonitis with prominent centrilobular nodules in the right lower lobe.  No mass.  No consolidation.   Pleural space:  Unremarkable.  No significant effusion.  No pneumothorax.   Heart: Moderate cardiomegaly.  No significant pericardial effusion.  Mild RV dysfunction.  There are numerous small varices in the mediastinum.   Bones joints:  Status post median sternotomy.  No acute fracture.  No dislocation.   Soft tissues:  Unremarkable.   Lymph nodes: Prominent mediastinal and hilar lymph nodes.  Prominent axillary lymph nodes. IMPRESSION:     No evidence of pulmonary emboli.  Findings concerning for bronchitis with pneumonitis, which may be of infectious or inflammatory etiologies.  Enlarged mediastinal hilar left nodes.  No consolidation. Moderate cardiomegaly with mild RV dysfunction. Enlarged main pulmonary artery     Electronically signed by Bhavya Jackson MD on 06-04-23 at 0527     I ordered the above noted radiological studies. Reviewed by me and discussed with radiologist.  See dictation for official radiology interpretation.      MEDICATIONS GIVEN IN ER    Medications   sodium chloride 0.9 % flush 10 mL (has no administration in time range)   morphine injection 4 mg (has no administration in time range)   ondansetron (ZOFRAN) injection 4 mg (4 mg Intravenous Given 6/4/23 0207)   morphine injection 4 mg (4 mg Intravenous Given 6/4/23 0208)   iopamidol (ISOVUE-370) 76 % injection 100 mL (95 mL Intravenous Given 6/4/23 0302)         ADDITIONAL ORDERS CONSIDERED BUT NOT ORDERED:  Considered admission however patient has reassuring lab work.  No evidence of sepsis.      PROGRESS, DATA ANALYSIS, CONSULTS, AND MEDICAL DECISION MAKING    All labs have been independently interpreted by myself.  All radiology studies have been independently interpreted by myself and discussed with radiologist dictating the report.   EKG's independently interpreted by myself.  Discussion below represents my analysis of pertinent findings related to patient's condition, differential diagnosis, treatment plan and final disposition.    I have discussed case with Dr. Chavez, emergency room physician.  He has performed his own bedside examination and agrees with treatment plan.    ED Course as of 06/04/23 0603   Sun Jun 04, 2023   0148 The patient presents with approximately 4-day history of generalized abdominal pain, cramping, constipation, as well as exertional shortness of breath chest pain.  Patient does have a history of pulmonary hypertension.  No chest pain at this time.    Patient did have a recent  flight to [place].  We will obtain CTA to rule out PE. [EE]   0155 Creatinine: 0.79 [EE]   0155 WBC: 4.35 [EE]   0155 proBNP(!): 525.0 [EE]   0155 HS Troponin T: 8 [EE]   0157 EKG independently interpreted myself.  Time 2236.  Sinus rhythm, 95 bpm.  Normal P/PAVAN.  QRS normal with normal axis.  Nonspecific T wave changes diffusely.  Similar to prior EKG from 6/7/2022. [EE]   0158 Chest x-ray independently interpreted myself shows mild vascular congestion. [EE]   0223 HS Troponin T: 8 [EE]   0600 Recheck of patient.  Updated her on work-up.  She denies any dysuria.  No suspicion of UTI.  Suspect colitis and bronchitis.  Will treat with antibiotics.  She understands return for any worsening symptoms. [EE]      ED Course User Index  [EE] Maycol Wu PA       AS OF 06:03 EDT VITALS:    BP - 144/84  HR - 80  TEMP - 99.3 °F (37.4 °C) (Tympanic)  O2 SATS - 92%        DIAGNOSIS  Final diagnoses:   Colitis   Bronchitis         DISPOSITION  Discharged      Dictated utilizing Dragon dictation     Maycol Wu PA  06/04/23 0604

## 2023-06-04 NOTE — ED PROVIDER NOTES
MD ATTESTATION NOTE    The [facility] and I have discussed this patient's history, physical exam, and treatment plan.  I have reviewed the documentation and personally had a face to face interaction with the patient. I affirm the documentation and agree with the treatment and plan.  The attached note describes my personal findings.      I provided a substantive portion of the care of the patient.  I personally performed the physical exam in its entirety, and below are my findings.  For this patient encounter, the patient wore surgical mask, I wore full protective PPE including N95 and eye protection.      Brief HPI: This patient is a 48-year-old female with a history of pulmonary hypertension presenting to the emergency room today with generalized abdominal discomfort as well as associated constipation with nausea.  She currently denies chest pain, dysuria/hematuria, fever/chills, or back pain.      PHYSICAL EXAM  ED Triage Vitals   Temp Heart Rate Resp BP SpO2   06/03/23 2229 06/03/23 2229 06/03/23 2340 06/03/23 2340 06/03/23 2229   99.3 °F (37.4 °C) 102 22 133/70 90 %      Temp src Heart Rate Source Patient Position BP Location FiO2 (%)   06/03/23 2229 06/03/23 2229 06/03/23 2340 06/03/23 2340 --   Tympanic Monitor Sitting Left arm          GENERAL: In mild distress, nontoxic in appearance  HENT: nares patent  EYES: no scleral icterus  CV: regular rhythm, normal rate, no M/R/G  RESPIRATORY: normal effort, lungs clear bilaterally  ABDOMEN: soft, generalized tenderness, nondistended, no rebound or guarding  MUSCULOSKELETAL: no deformity, no edema  NEURO: alert, moves all extremities, follows commands  PSYCH:  calm, cooperative  SKIN: warm, dry    Vital signs and nursing notes reviewed.        Plan: We will treat the patient's discomfort and nausea as we obtain labs as well as a CT scan of the chest/abdomen/pelvis.  We will monitor and reassess following.       Abraham Chavez MD  06/04/23 0234

## 2023-06-04 NOTE — ED TRIAGE NOTES
Patient presents ambulatory reporting constipation and bloating since Wednesday. Patient states she's been able to move her bowels very small amounts and it is mostly mucus. Patient also reports nausea. Patient reports muscle pain and shortness of breath with walking short distances since 5/24/2023, hx pulmonary hypertension. Patient also reports sternal chest pain described as tightness, worse with exertion.

## 2023-06-04 NOTE — PROGRESS NOTES
You have chosen to receive care through a telehealth visit.  Do you consent to use a video/audio connection for your medical care today? Yes     CHIEF COMPLAINT  No chief complaint on file.        HPI  Cleo Menon is a 48 y.o. female  presents with complaint of muscle pains, chest pain with walking and bloating. Pt has pulmonary hypertension.  Reports she is out of breath and her heart is pounding. Reports she has been in [place] and returned on 5-25-23 she started feeling out of breath when walking a lot in [place]. Reports she has been very constipated and has passed alittle stool today with mucous. Very tired. Reports she has taken Miralax, dorene seltzer that hasn't helped much.      Review of Systems   Constitutional:  Positive for fatigue. Negative for chills and fever.   HENT:  Negative for congestion, ear discharge, ear pain, sinus pressure, sinus pain and sore throat.    Respiratory:  Positive for shortness of breath. Negative for cough, chest tightness and wheezing.    Cardiovascular:  Positive for chest pain.        History of chest pain when walking a lot.    Gastrointestinal:  Positive for abdominal pain, constipation and nausea. Negative for diarrhea and vomiting.        Little abdominal pain, feels full   Musculoskeletal:  Negative for back pain and myalgias.   Neurological:  Negative for dizziness and headaches.   Psychiatric/Behavioral: Negative.       Past Medical History:   Diagnosis Date    Enlarged heart     Hypertension     Pulmonary hypertension        History reviewed. No pertinent family history.    Social History     Socioeconomic History    Marital status: Significant Other   Tobacco Use    Smoking status: Never    Smokeless tobacco: Never   Substance and Sexual Activity    Alcohol use: Never    Drug use: Never    Sexual activity: Defer       Cleo Menon  reports that she has never smoked. She has never used smokeless tobacco.. I have educated her on the risk of diseases  from using tobacco products such as cancer, COPD, heart disease, and reproductive problems.         I spent  1  minutes counseling the patient.              Breastfeeding No     PHYSICAL EXAM  Physical Exam  Constitutional:       General: She is awake.      Appearance: Normal appearance.   HENT:      Head: Normocephalic and atraumatic.      Right Ear: Hearing normal.      Left Ear: Hearing normal.      Nose: Nose normal.      Mouth/Throat:      Lips: Pink.   Eyes:      General: Lids are normal.   Abdominal:      Palpations: Abdomen is soft.      Tenderness: There is generalized abdominal tenderness.      Comments: Patient directed exam  Mild generalized abdominal pain   Neurological:      Mental Status: She is alert.   Psychiatric:         Behavior: Behavior is cooperative.        Results for orders placed or performed during the hospital encounter of 06/27/22   Respiratory Panel PCR w/COVID-19(SARS-CoV-2) VIJAY/ARCHIE/MARIO/PAD/COR/MAD/TAMI In-House, NP Swab in UTM/VTM, 3-4 HR TAT - Swab, Nasopharynx    Specimen: Nasopharynx; Swab   Result Value Ref Range    ADENOVIRUS, PCR Not Detected Not Detected    Coronavirus 229E Not Detected Not Detected    Coronavirus HKU1 Not Detected Not Detected    Coronavirus NL63 Not Detected Not Detected    Coronavirus OC43 Not Detected Not Detected    COVID19 Not Detected Not Detected - Ref. Range    Human Metapneumovirus Detected (A) Not Detected    Human Rhinovirus/Enterovirus Not Detected Not Detected    Influenza A PCR Not Detected Not Detected    Influenza B PCR Not Detected Not Detected    Parainfluenza Virus 1 Not Detected Not Detected    Parainfluenza Virus 2 Not Detected Not Detected    Parainfluenza Virus 3 Not Detected Not Detected    Parainfluenza Virus 4 Not Detected Not Detected    RSV, PCR Not Detected Not Detected    Bordetella pertussis pcr Not Detected Not Detected    Bordetella parapertussis PCR Not Detected Not Detected    Chlamydophila pneumoniae PCR Not Detected Not  Detected    Mycoplasma pneumo by PCR Not Detected Not Detected   Comprehensive Metabolic Panel    Specimen: Blood   Result Value Ref Range    Glucose 92 65 - 99 mg/dL    BUN 8 6 - 20 mg/dL    Creatinine 0.84 0.57 - 1.00 mg/dL    Sodium 136 136 - 145 mmol/L    Potassium 3.7 3.5 - 5.2 mmol/L    Chloride 101 98 - 107 mmol/L    CO2 19.7 (L) 22.0 - 29.0 mmol/L    Calcium 10.0 8.6 - 10.5 mg/dL    Total Protein 7.9 6.0 - 8.5 g/dL    Albumin 4.70 3.50 - 5.20 g/dL    ALT (SGPT) 10 1 - 33 U/L    AST (SGOT) 13 1 - 32 U/L    Alkaline Phosphatase 73 39 - 117 U/L    Total Bilirubin 0.6 0.0 - 1.2 mg/dL    Globulin 3.2 gm/dL    A/G Ratio 1.5 g/dL    BUN/Creatinine Ratio 9.5 7.0 - 25.0    Anion Gap 15.3 (H) 5.0 - 15.0 mmol/L    eGFR 87.5 >60.0 mL/min/1.73   BNP    Specimen: Blood   Result Value Ref Range    proBNP 587.0 (H) 0.0 - 450.0 pg/mL   Troponin    Specimen: Blood   Result Value Ref Range    Troponin T <0.010 0.000 - 0.030 ng/mL   CBC Auto Differential    Specimen: Blood   Result Value Ref Range    WBC 3.64 3.40 - 10.80 10*3/mm3    RBC 4.56 3.77 - 5.28 10*6/mm3    Hemoglobin 13.3 12.0 - 15.9 g/dL    Hematocrit 40.2 34.0 - 46.6 %    MCV 88.2 79.0 - 97.0 fL    MCH 29.2 26.6 - 33.0 pg    MCHC 33.1 31.5 - 35.7 g/dL    RDW 13.0 12.3 - 15.4 %    RDW-SD 41.9 37.0 - 54.0 fl    MPV 11.0 6.0 - 12.0 fL    Platelets 163 140 - 450 10*3/mm3    Neutrophil % 62.1 42.7 - 76.0 %    Lymphocyte % 23.6 19.6 - 45.3 %    Monocyte % 13.2 (H) 5.0 - 12.0 %    Eosinophil % 0.5 0.3 - 6.2 %    Basophil % 0.3 0.0 - 1.5 %    Immature Grans % 0.3 0.0 - 0.5 %    Neutrophils, Absolute 2.26 1.70 - 7.00 10*3/mm3    Lymphocytes, Absolute 0.86 0.70 - 3.10 10*3/mm3    Monocytes, Absolute 0.48 0.10 - 0.90 10*3/mm3    Eosinophils, Absolute 0.02 0.00 - 0.40 10*3/mm3    Basophils, Absolute 0.01 0.00 - 0.20 10*3/mm3    Immature Grans, Absolute 0.01 0.00 - 0.05 10*3/mm3    nRBC 0.0 0.0 - 0.2 /100 WBC   Urinalysis With Microscopic If Indicated (No Culture) - Urine, Clean  Catch    Specimen: Urine, Clean Catch   Result Value Ref Range    Color, UA Yellow Yellow, Straw    Appearance, UA Clear Clear    pH, UA 5.5 5.0 - 8.0    Specific Gravity, UA 1.013 1.005 - 1.030    Glucose, UA Negative Negative    Ketones, UA Negative Negative    Bilirubin, UA Negative Negative    Blood, UA Trace (A) Negative    Protein, UA Trace (A) Negative    Leuk Esterase, UA Negative Negative    Nitrite, UA Negative Negative    Urobilinogen, UA 0.2 E.U./dL 0.2 - 1.0 E.U./dL   Urinalysis, Microscopic Only - Urine, Clean Catch    Specimen: Urine, Clean Catch   Result Value Ref Range    RBC, UA 0-2 None Seen, 0-2 /HPF    WBC, UA 0-2 None Seen, 0-2 /HPF    Bacteria, UA None Seen None Seen /HPF    Squamous Epithelial Cells, UA 0-2 None Seen, 0-2 /HPF    Hyaline Casts, UA None Seen None Seen /LPF    Methodology Automated Microscopy    ECG 12 Lead   Result Value Ref Range    QT Interval 369 ms   Green Top (Gel)   Result Value Ref Range    Extra Tube Hold for add-ons.    Lavender Top   Result Value Ref Range    Extra Tube hold for add-on    Light Blue Top   Result Value Ref Range    Extra Tube Hold for add-ons.        Diagnoses and all orders for this visit:    1. Shortness of breath (Primary)    2. Constipation, unspecified constipation type    3. History of exertional chest pain    Advised patient to go to ER for in person evaluation. Patient reports she is close to [facility] and will have family member to take her. Understands if symptoms worsen call 911 to transport. Patient understands the risk and consequences of not going to the ER        NORA Shankar  06/03/2023  21:51 EDT    The use of a video visit has been reviewed with the patient and verbal informed consent has been obtained. Myself and Cleo Menon participated in this visit. The patient is located in [address] [place] [address].    I am located in [place], [place]. Mychart and Twilio were utilized. I spent 7 minutes in the  patient's chart for this visit.

## 2023-06-05 ENCOUNTER — TELEPHONE (OUTPATIENT)
Dept: GASTROENTEROLOGY | Facility: CLINIC | Age: 48
End: 2023-06-05

## 2023-06-05 LAB — QT INTERVAL: 350 MS

## 2023-06-05 NOTE — TELEPHONE ENCOUNTER
Caller: JEFF MCCAULEY    [facility] call back number: [phone]    Patient is needing: HOSPITAL FOLLOW UP. BAD INFECTION IN COLON.

## 2023-06-08 ENCOUNTER — HOSPITAL ENCOUNTER (OUTPATIENT)
Facility: HOSPITAL | Age: 48
Setting detail: OBSERVATION
Discharge: HOME | End: 2023-06-10
Attending: EMERGENCY MEDICINE | Admitting: INTERNAL MEDICINE
Payer: MEDICARE

## 2023-06-08 DIAGNOSIS — E87.6 HYPOKALEMIA: ICD-10-CM

## 2023-06-08 DIAGNOSIS — K52.9 COLITIS: Primary | ICD-10-CM

## 2023-06-08 LAB
ALBUMIN SERPL-MCNC: 4.6 G/DL (ref 3.5–5.2)
ALBUMIN/GLOB SERPL: 1.5 G/DL
ALP SERPL-CCNC: 65 U/L (ref 39–117)
ALT SERPL W P-5'-P-CCNC: 25 U/L (ref 1–33)
ANION GAP SERPL CALCULATED.3IONS-SCNC: 14.8 MMOL/L (ref 5–15)
AST SERPL-CCNC: 20 U/L (ref 1–32)
BASOPHILS # BLD AUTO: 0.01 10*3/MM3 (ref 0–0.2)
BASOPHILS NFR BLD AUTO: 0.3 % (ref 0–1.5)
BILIRUB SERPL-MCNC: 0.8 MG/DL (ref 0–1.2)
BILIRUB UR QL STRIP: NEGATIVE
BUN SERPL-MCNC: 6 MG/DL (ref 6–20)
BUN/CREAT SERPL: 8.6 (ref 7–25)
CALCIUM SPEC-SCNC: 9.3 MG/DL (ref 8.6–10.5)
CHLORIDE SERPL-SCNC: 105 MMOL/L (ref 98–107)
CLARITY UR: CLEAR
CO2 SERPL-SCNC: 22.2 MMOL/L (ref 22–29)
COLOR UR: YELLOW
CREAT SERPL-MCNC: 0.7 MG/DL (ref 0.57–1)
DEPRECATED RDW RBC AUTO: 42.3 FL (ref 37–54)
EGFRCR SERPLBLD CKD-EPI 2021: 108.2 ML/MIN/1.73
EOSINOPHIL # BLD AUTO: 0.05 10*3/MM3 (ref 0–0.4)
EOSINOPHIL NFR BLD AUTO: 1.3 % (ref 0.3–6.2)
ERYTHROCYTE [DISTWIDTH] IN BLOOD BY AUTOMATED COUNT: 13.6 % (ref 12.3–15.4)
GLOBULIN UR ELPH-MCNC: 3 GM/DL
GLUCOSE SERPL-MCNC: 100 MG/DL (ref 65–99)
GLUCOSE UR STRIP-MCNC: NEGATIVE MG/DL
HCT VFR BLD AUTO: 33.9 % (ref 34–46.6)
HGB BLD-MCNC: 11.1 G/DL (ref 12–15.9)
HGB UR QL STRIP.AUTO: NEGATIVE
HOLD SPECIMEN: NORMAL
HOLD SPECIMEN: NORMAL
IMM GRANULOCYTES # BLD AUTO: 0.02 10*3/MM3 (ref 0–0.05)
IMM GRANULOCYTES NFR BLD AUTO: 0.5 % (ref 0–0.5)
INR PPP: 0.99 (ref 0.9–1.1)
KETONES UR QL STRIP: ABNORMAL
LEUKOCYTE ESTERASE UR QL STRIP.AUTO: NEGATIVE
LIPASE SERPL-CCNC: 17 U/L (ref 13–60)
LYMPHOCYTES # BLD AUTO: 0.86 10*3/MM3 (ref 0.7–3.1)
LYMPHOCYTES NFR BLD AUTO: 22.6 % (ref 19.6–45.3)
MAGNESIUM SERPL-MCNC: 2.2 MG/DL (ref 1.6–2.6)
MCH RBC QN AUTO: 28.3 PG (ref 26.6–33)
MCHC RBC AUTO-ENTMCNC: 32.7 G/DL (ref 31.5–35.7)
MCV RBC AUTO: 86.5 FL (ref 79–97)
MONOCYTES # BLD AUTO: 0.28 10*3/MM3 (ref 0.1–0.9)
MONOCYTES NFR BLD AUTO: 7.3 % (ref 5–12)
NEUTROPHILS NFR BLD AUTO: 2.59 10*3/MM3 (ref 1.7–7)
NEUTROPHILS NFR BLD AUTO: 68 % (ref 42.7–76)
NITRITE UR QL STRIP: NEGATIVE
NRBC BLD AUTO-RTO: 0 /100 WBC (ref 0–0.2)
PH UR STRIP.AUTO: 6 [PH] (ref 5–8)
PLATELET # BLD AUTO: 195 10*3/MM3 (ref 140–450)
PMV BLD AUTO: 10.1 FL (ref 6–12)
POTASSIUM SERPL-SCNC: 3 MMOL/L (ref 3.5–5.2)
PROT SERPL-MCNC: 7.6 G/DL (ref 6–8.5)
PROT UR QL STRIP: NEGATIVE
PROTHROMBIN TIME: 13.2 SECONDS (ref 11.7–14.2)
RBC # BLD AUTO: 3.92 10*6/MM3 (ref 3.77–5.28)
SODIUM SERPL-SCNC: 142 MMOL/L (ref 136–145)
SP GR UR STRIP: 1.01 (ref 1–1.03)
UROBILINOGEN UR QL STRIP: ABNORMAL
WBC NRBC COR # BLD: 3.81 10*3/MM3 (ref 3.4–10.8)
WHOLE BLOOD HOLD COAG: NORMAL
WHOLE BLOOD HOLD SPECIMEN: NORMAL

## 2023-06-08 PROCEDURE — 83735 ASSAY OF MAGNESIUM: CPT | Performed by: NURSE PRACTITIONER

## 2023-06-08 PROCEDURE — 81003 URINALYSIS AUTO W/O SCOPE: CPT | Performed by: NURSE PRACTITIONER

## 2023-06-08 PROCEDURE — 80053 COMPREHEN METABOLIC PANEL: CPT | Performed by: NURSE PRACTITIONER

## 2023-06-08 PROCEDURE — 99285 EMERGENCY DEPT VISIT HI MDM: CPT

## 2023-06-08 PROCEDURE — 96365 THER/PROPH/DIAG IV INF INIT: CPT

## 2023-06-08 PROCEDURE — 96361 HYDRATE IV INFUSION ADD-ON: CPT

## 2023-06-08 PROCEDURE — G0378 HOSPITAL OBSERVATION PER HR: HCPCS

## 2023-06-08 PROCEDURE — 96375 TX/PRO/DX INJ NEW DRUG ADDON: CPT

## 2023-06-08 PROCEDURE — 85025 COMPLETE CBC W/AUTO DIFF WBC: CPT | Performed by: NURSE PRACTITIONER

## 2023-06-08 PROCEDURE — 25010000002 PIPERACILLIN SOD-TAZOBACTAM PER 1 G: Performed by: NURSE PRACTITIONER

## 2023-06-08 PROCEDURE — 25010000002 SODIUM CHLORIDE 0.9 % WITH KCL 20 MEQ 20-0.9 MEQ/L-% SOLUTION: Performed by: INTERNAL MEDICINE

## 2023-06-08 PROCEDURE — 25010000002 MORPHINE PER 10 MG: Performed by: NURSE PRACTITIONER

## 2023-06-08 PROCEDURE — 85610 PROTHROMBIN TIME: CPT | Performed by: NURSE PRACTITIONER

## 2023-06-08 PROCEDURE — 87040 BLOOD CULTURE FOR BACTERIA: CPT | Performed by: INTERNAL MEDICINE

## 2023-06-08 PROCEDURE — 25010000002 ONDANSETRON PER 1 MG: Performed by: NURSE PRACTITIONER

## 2023-06-08 PROCEDURE — 96376 TX/PRO/DX INJ SAME DRUG ADON: CPT

## 2023-06-08 PROCEDURE — 83690 ASSAY OF LIPASE: CPT | Performed by: NURSE PRACTITIONER

## 2023-06-08 PROCEDURE — 83605 ASSAY OF LACTIC ACID: CPT | Performed by: INTERNAL MEDICINE

## 2023-06-08 RX ORDER — POLYETHYLENE GLYCOL 3350 17 G/17G
17 POWDER, FOR SOLUTION ORAL DAILY PRN
Status: DISCONTINUED | OUTPATIENT
Start: 2023-06-08 | End: 2023-06-09

## 2023-06-08 RX ORDER — POTASSIUM CHLORIDE 750 MG/1
40 TABLET, FILM COATED, EXTENDED RELEASE ORAL ONCE
Status: COMPLETED | OUTPATIENT
Start: 2023-06-08 | End: 2023-06-08

## 2023-06-08 RX ORDER — SERTRALINE HYDROCHLORIDE 100 MG/1
100 TABLET, FILM COATED ORAL NIGHTLY
Status: DISCONTINUED | OUTPATIENT
Start: 2023-06-08 | End: 2023-06-10 | Stop reason: HOSPADM

## 2023-06-08 RX ORDER — ACETAMINOPHEN 325 MG/1
650 TABLET ORAL EVERY 4 HOURS PRN
Status: DISCONTINUED | OUTPATIENT
Start: 2023-06-08 | End: 2023-06-10 | Stop reason: HOSPADM

## 2023-06-08 RX ORDER — SERTRALINE HYDROCHLORIDE 100 MG/1
100 TABLET, FILM COATED ORAL DAILY
Status: DISCONTINUED | OUTPATIENT
Start: 2023-06-09 | End: 2023-06-08

## 2023-06-08 RX ORDER — POTASSIUM CHLORIDE 750 MG/1
40 TABLET, FILM COATED, EXTENDED RELEASE ORAL ONCE
Status: DISCONTINUED | OUTPATIENT
Start: 2023-06-08 | End: 2023-06-08

## 2023-06-08 RX ORDER — POTASSIUM CHLORIDE 750 MG/1
40 TABLET, FILM COATED, EXTENDED RELEASE ORAL EVERY 4 HOURS
Status: COMPLETED | OUTPATIENT
Start: 2023-06-08 | End: 2023-06-09

## 2023-06-08 RX ORDER — MORPHINE SULFATE 2 MG/ML
2 INJECTION, SOLUTION INTRAMUSCULAR; INTRAVENOUS
Status: DISCONTINUED | OUTPATIENT
Start: 2023-06-08 | End: 2023-06-10 | Stop reason: HOSPADM

## 2023-06-08 RX ORDER — MORPHINE SULFATE 2 MG/ML
4 INJECTION, SOLUTION INTRAMUSCULAR; INTRAVENOUS ONCE
Status: COMPLETED | OUTPATIENT
Start: 2023-06-08 | End: 2023-06-08

## 2023-06-08 RX ORDER — POTASSIUM CHLORIDE 1.5 G/1.77G
40 POWDER, FOR SOLUTION ORAL ONCE
Status: DISCONTINUED | OUTPATIENT
Start: 2023-06-08 | End: 2023-06-08 | Stop reason: SDUPTHER

## 2023-06-08 RX ORDER — ONDANSETRON 2 MG/ML
4 INJECTION INTRAMUSCULAR; INTRAVENOUS ONCE
Status: COMPLETED | OUTPATIENT
Start: 2023-06-08 | End: 2023-06-08

## 2023-06-08 RX ORDER — SODIUM CHLORIDE AND POTASSIUM CHLORIDE 150; 900 MG/100ML; MG/100ML
100 INJECTION, SOLUTION INTRAVENOUS CONTINUOUS
Status: DISCONTINUED | OUTPATIENT
Start: 2023-06-08 | End: 2023-06-09

## 2023-06-08 RX ORDER — ACETAMINOPHEN 650 MG/1
650 SUPPOSITORY RECTAL EVERY 4 HOURS PRN
Status: DISCONTINUED | OUTPATIENT
Start: 2023-06-08 | End: 2023-06-10 | Stop reason: HOSPADM

## 2023-06-08 RX ORDER — RIOCIGUAT 2.5 MG/1
2.5 TABLET, FILM COATED ORAL 3 TIMES DAILY
COMMUNITY

## 2023-06-08 RX ORDER — TIZANIDINE 4 MG/1
4 TABLET ORAL EVERY 8 HOURS PRN
Status: DISCONTINUED | OUTPATIENT
Start: 2023-06-08 | End: 2023-06-10 | Stop reason: HOSPADM

## 2023-06-08 RX ORDER — SODIUM CHLORIDE 0.9 % (FLUSH) 0.9 %
10 SYRINGE (ML) INJECTION AS NEEDED
Status: DISCONTINUED | OUTPATIENT
Start: 2023-06-08 | End: 2023-06-10 | Stop reason: HOSPADM

## 2023-06-08 RX ORDER — BISACODYL 10 MG
10 SUPPOSITORY, RECTAL RECTAL DAILY PRN
Status: DISCONTINUED | OUTPATIENT
Start: 2023-06-08 | End: 2023-06-09

## 2023-06-08 RX ORDER — AMOXICILLIN 250 MG
2 CAPSULE ORAL 2 TIMES DAILY
Status: DISCONTINUED | OUTPATIENT
Start: 2023-06-08 | End: 2023-06-09

## 2023-06-08 RX ORDER — BISACODYL 5 MG/1
5 TABLET, DELAYED RELEASE ORAL DAILY PRN
Status: DISCONTINUED | OUTPATIENT
Start: 2023-06-08 | End: 2023-06-09

## 2023-06-08 RX ORDER — SODIUM CHLORIDE 9 MG/ML
75 INJECTION, SOLUTION INTRAVENOUS CONTINUOUS
Status: DISCONTINUED | OUTPATIENT
Start: 2023-06-08 | End: 2023-06-08

## 2023-06-08 RX ORDER — ONDANSETRON 2 MG/ML
4 INJECTION INTRAMUSCULAR; INTRAVENOUS EVERY 6 HOURS PRN
Status: DISCONTINUED | OUTPATIENT
Start: 2023-06-08 | End: 2023-06-10 | Stop reason: HOSPADM

## 2023-06-08 RX ORDER — ACETAMINOPHEN 160 MG/5ML
650 SOLUTION ORAL EVERY 4 HOURS PRN
Status: DISCONTINUED | OUTPATIENT
Start: 2023-06-08 | End: 2023-06-10 | Stop reason: HOSPADM

## 2023-06-08 RX ADMIN — SODIUM CHLORIDE 1000 ML: 9 INJECTION, SOLUTION INTRAVENOUS at 17:53

## 2023-06-08 RX ADMIN — POTASSIUM CHLORIDE 40 MEQ: 750 TABLET, EXTENDED RELEASE ORAL at 18:57

## 2023-06-08 RX ADMIN — ONDANSETRON 4 MG: 2 INJECTION INTRAMUSCULAR; INTRAVENOUS at 17:50

## 2023-06-08 RX ADMIN — MORPHINE SULFATE 4 MG: 2 INJECTION, SOLUTION INTRAMUSCULAR; INTRAVENOUS at 17:49

## 2023-06-08 RX ADMIN — DOCUSATE SODIUM 50MG AND SENNOSIDES 8.6MG 2 TABLET: 8.6; 5 TABLET, FILM COATED ORAL at 22:12

## 2023-06-08 RX ADMIN — POTASSIUM CHLORIDE AND SODIUM CHLORIDE 100 ML/HR: 900; 150 INJECTION, SOLUTION INTRAVENOUS at 19:27

## 2023-06-08 RX ADMIN — TAZOBACTAM SODIUM AND PIPERACILLIN SODIUM 3.38 G: 375; 3 INJECTION, SOLUTION INTRAVENOUS at 18:51

## 2023-06-08 RX ADMIN — POTASSIUM CHLORIDE 40 MEQ: 750 TABLET, EXTENDED RELEASE ORAL at 23:26

## 2023-06-08 RX ADMIN — MORPHINE SULFATE 2 MG: 2 INJECTION, SOLUTION INTRAMUSCULAR; INTRAVENOUS at 22:07

## 2023-06-08 NOTE — ED NOTES
Pt via PV from home with c/o abd pain, N/V/D x 1 week; pt reports being seen here within the last week and just not getting better.

## 2023-06-08 NOTE — ED PROVIDER NOTES
EMERGENCY DEPARTMENT ENCOUNTER    Room Number:    Date seen:  2023  PCP: Rm Tidwell MD  Historian: patient      HPI:  Chief Complaint: abd pain  A complete HPI/ROS/PMH/PSH/SH/FH are unobtainable due to: nothing  Context: Cleo Menon is a pleasant afebrile ambulatory 48 y.o.  female who presents to the ED c/o abdominal pain, nausea, vomiting      She was seen and evaluated in the emergency department here 4 days ago and diagnosed with colitis, written prescription for Augmentin patient reports she has been feeling miserable since then.  States her symptoms started about a week ago with abdominal pain, cramping, constipation and nausea.  Now she states that she has developed nonbilious nonbloody vomiting with persistent nausea and more loose stools.  States she has not really been able to tolerate much p.o., was drinking water today and was throwing this up.  She has not been able to eat much food and is feeling more weak.  Does not feel that she is managing well at home and was hoping she could be admitted.      PAST MEDICAL HISTORY  Active Ambulatory Problems     Diagnosis Date Noted    No Active Ambulatory Problems     Resolved Ambulatory Problems     Diagnosis Date Noted    No Resolved Ambulatory Problems     Past Medical History:   Diagnosis Date    Enlarged heart     Hypertension     Pulmonary hypertension          PAST SURGICAL HISTORY  Past Surgical History:   Procedure Laterality Date     SECTION      CHOLECYSTECTOMY      LUMBAR SPINAL CORD TUMOR REMOVAL      VSD REPAIR           FAMILY HISTORY  No family history on file.      SOCIAL HISTORY  Social History     Socioeconomic History    Marital status: Significant Other   Tobacco Use    Smoking status: Never    Smokeless tobacco: Never   Substance and Sexual Activity    Alcohol use: Never    Drug use: Never    Sexual activity: Defer         ALLERGIES  Patient has no known allergies.        REVIEW OF SYSTEMS  Review of  Systems   Gastrointestinal:  Positive for abdominal pain, nausea and vomiting.   All other systems reviewed and are negative.         PHYSICAL EXAM  ED Triage Vitals [06/08/23 1713]   Temp Heart Rate Resp BP SpO2   99.1 °F (37.3 °C) 104 20 -- 90 %      Temp src Heart Rate Source Patient Position BP Location FiO2 (%)   -- -- -- -- --       Physical Exam    GENERAL: Alert and oriented x4, no acute distress  HENT: nares patent mucous membranes are moist and intact  EYES: no scleral icterus, no injection or icterus  CV: regular rhythm, normal rate, no murmur  RESPIRATORY: normal effort clear to all fields bilaterally, able to speak in full sentences without hint of distress  ABDOMEN: Abdomen, moderate diffuse lower abdominal tenderness to palpation, bowel sounds WNL, no rebound or guarding no organomegaly, abdominal obesity present  MUSCULOSKELETAL: no deformity normal active range of motion of all extremities  NEURO: alert, moves all extremities, follows commands  PSYCH:  calm, cooperative  SKIN: warm, dry and intact    Vital signs and nursing notes reviewed.          LAB RESULTS  Recent Results (from the past 24 hour(s))   Comprehensive Metabolic Panel    Collection Time: 06/08/23  5:50 PM    Specimen: Blood   Result Value Ref Range    Glucose 100 (H) 65 - 99 mg/dL    BUN 6 6 - 20 mg/dL    Creatinine 0.70 0.57 - 1.00 mg/dL    Sodium 142 136 - 145 mmol/L    Potassium 3.0 (L) 3.5 - 5.2 mmol/L    Chloride 105 98 - 107 mmol/L    CO2 22.2 22.0 - 29.0 mmol/L    Calcium 9.3 8.6 - 10.5 mg/dL    Total Protein 7.6 6.0 - 8.5 g/dL    Albumin 4.6 3.5 - 5.2 g/dL    ALT (SGPT) 25 1 - 33 U/L    AST (SGOT) 20 1 - 32 U/L    Alkaline Phosphatase 65 39 - 117 U/L    Total Bilirubin 0.8 0.0 - 1.2 mg/dL    Globulin 3.0 gm/dL    A/G Ratio 1.5 g/dL    BUN/Creatinine Ratio 8.6 7.0 - 25.0    Anion Gap 14.8 5.0 - 15.0 mmol/L    eGFR 108.2 >60.0 mL/min/1.73   Lipase    Collection Time: 06/08/23  5:50 PM    Specimen: Blood   Result Value Ref Range     Lipase 17 13 - 60 U/L   Protime-INR    Collection Time: 06/08/23  5:50 PM    Specimen: Blood   Result Value Ref Range    Protime 13.2 11.7 - 14.2 Seconds    INR 0.99 0.90 - 1.10   Green Top (Gel)    Collection Time: 06/08/23  5:50 PM   Result Value Ref Range    Extra Tube Hold for add-ons.    Lavender Top    Collection Time: 06/08/23  5:50 PM   Result Value Ref Range    Extra Tube hold for add-on    Gold Top - SST    Collection Time: 06/08/23  5:50 PM   Result Value Ref Range    Extra Tube Hold for add-ons.    Light Blue Top    Collection Time: 06/08/23  5:50 PM   Result Value Ref Range    Extra Tube Hold for add-ons.    CBC Auto Differential    Collection Time: 06/08/23  5:50 PM    Specimen: Blood   Result Value Ref Range    WBC 3.81 3.40 - 10.80 10*3/mm3    RBC 3.92 3.77 - 5.28 10*6/mm3    Hemoglobin 11.1 (L) 12.0 - 15.9 g/dL    Hematocrit 33.9 (L) 34.0 - 46.6 %    MCV 86.5 79.0 - 97.0 fL    MCH 28.3 26.6 - 33.0 pg    MCHC 32.7 31.5 - 35.7 g/dL    RDW 13.6 12.3 - 15.4 %    RDW-SD 42.3 37.0 - 54.0 fl    MPV 10.1 6.0 - 12.0 fL    Platelets 195 140 - 450 10*3/mm3    Neutrophil % 68.0 42.7 - 76.0 %    Lymphocyte % 22.6 19.6 - 45.3 %    Monocyte % 7.3 5.0 - 12.0 %    Eosinophil % 1.3 0.3 - 6.2 %    Basophil % 0.3 0.0 - 1.5 %    Immature Grans % 0.5 0.0 - 0.5 %    Neutrophils, Absolute 2.59 1.70 - 7.00 10*3/mm3    Lymphocytes, Absolute 0.86 0.70 - 3.10 10*3/mm3    Monocytes, Absolute 0.28 0.10 - 0.90 10*3/mm3    Eosinophils, Absolute 0.05 0.00 - 0.40 10*3/mm3    Basophils, Absolute 0.01 0.00 - 0.20 10*3/mm3    Immature Grans, Absolute 0.02 0.00 - 0.05 10*3/mm3    nRBC 0.0 0.0 - 0.2 /100 WBC   Magnesium    Collection Time: 06/08/23  5:50 PM    Specimen: Blood   Result Value Ref Range    Magnesium 2.2 1.6 - 2.6 mg/dL   Urinalysis With Microscopic If Indicated (No Culture) - Urine, Clean Catch    Collection Time: 06/08/23  5:51 PM    Specimen: Urine, Clean Catch   Result Value Ref Range    Color, UA Yellow Yellow, Straw     Appearance, UA Clear Clear    pH, UA 6.0 5.0 - 8.0    Specific Gravity, UA 1.012 1.005 - 1.030    Glucose, UA Negative Negative    Ketones, UA 40 mg/dL (2+) (A) Negative    Bilirubin, UA Negative Negative    Blood, UA Negative Negative    Protein, UA Negative Negative    Leuk Esterase, UA Negative Negative    Nitrite, UA Negative Negative    Urobilinogen, UA 0.2 E.U./dL 0.2 - 1.0 E.U./dL       Ordered the above labs and reviewed the results.        RADIOLOGY  No Radiology Exams Resulted Within Past 24 Hours    Ordered the above noted radiological studies. Reviewed by me in PACS.            PROCEDURES  Procedures        MEDICATIONS GIVEN IN ER  Medications   sodium chloride 0.9 % flush 10 mL (has no administration in time range)   sodium chloride 0.9 % bolus 1,000 mL (has no administration in time range)   morphine injection 4 mg (has no administration in time range)   ondansetron (ZOFRAN) injection 4 mg (has no administration in time range)                   MEDICAL DECISION MAKING, PROGRESS, and CONSULTS    All labs have been independently reviewed by me.  All radiology studies have been reviewed by me and I have also reviewed the radiology report.   EKG's independently viewed and interpreted by me.  Discussion below represents my analysis of pertinent findings related to patient's condition, differential diagnosis, treatment plan and final disposition.      Additional sources:  - Discussed/ obtained information from independent historians: History obtained from patient who is a reliable historian    - External (non-ED) record review: 2/20/2023 internal medicine now with Dr. Tidwell for generalized anxiety secondary to loss of her sister 1/2023    - Chronic or social conditions impacting care: None    - Shared decision making: Patient offered IV hydration and discharge home versus admission to the hospital for possible GI consultation she reports that she does not feel safe going home as she has been on oral  antibiotics and not able to tolerate this at home for the last 4 days      Orders placed during this visit:  Orders Placed This Encounter   Procedures    [place] Draw    Comprehensive Metabolic Panel    Lipase    Urinalysis With Microscopic If Indicated (No Culture) - Urine, Clean Catch    Protime-INR    Insert Peripheral IV    CBC & Differential         Additional orders considered but not ordered:  Considered checking a CT abdomen pelvis but given that she had one 4 days ago that showed pancolitis and her lab work-up is reassuring I do not think it would add much to the clinical picture at this time        Differential diagnosis includes but is not limited to:    Colitis, dehydration, acute kidney injury      Independent interpretation of labs, radiology studies, and discussions with consultants:  ED Course as of 06/08/23 2129   Th Jun 08, 2023   1833 Phone call with dr. morrison.  Discussed the patient, relevant history, exam, diagnostics, ED findings/progress, and concerns. They agree to admit to a MS bed   [AH]      ED Course User Index  [AH] shane, Yessy, NORA             I have worn appropriate PPE during this patient encounter, sanitized my hands both with entering and exiting patient's room.      DIAGNOSIS  Final diagnoses:   Colitis   Hypokalemia         DISPOSITION  Admitted to medicine            Latest Documented Vital Signs:  As of 17:17 EDT  BP- 139/77 HR- 104 Temp- 99.1 °F (37.3 °C) O2 sat- 90%              --    Please note that portions of this were completed with a voice recognition program.       Note Disclaimer: At [facility], we believe that sharing information builds trust and better relationships. You are receiving this note because you are receiving care at [facility] or recently visited. It is possible you will see health information before a provider has talked with you about it. This kind of information can be easy to misunderstand. To help you fully understand what it means for  your health, we urge you to discuss this note with your provider.             Yessy Charlton, NORA  06/08/23 2455

## 2023-06-08 NOTE — PROGRESS NOTES
Clinical Pharmacy Services: Medication History    Cleo Menon is a 48 y.o. female presenting to [facility] for   Chief Complaint   Patient presents with    Abdominal Pain       She  has a past medical history of Enlarged heart, Hypertension, and Pulmonary hypertension.    Allergies as of 06/08/2023    (No Known Allergies)       Medication information was obtained from: Patient   Pharmacy and Phone Number:     Prior to Admission Medications       Prescriptions Last Dose Informant Patient Reported? Taking?    amoxicillin-clavulanate (AUGMENTIN) 875-125 MG per tablet 6/8/2023 Self No Yes    Take 1 tablet by mouth 2 (Two) Times a Day.    ferrous sulfate 325 (65 FE) MG tablet  Self Yes Yes    Take 1 tablet by mouth Daily As Needed.    HYDROcodone-acetaminophen (NORCO) 7.5-325 MG per tablet  Self Yes Yes    Take 1 tablet by mouth Every 8 (Eight) Hours As Needed.    ibuprofen (ADVIL,MOTRIN) 600 MG tablet  Self Yes Yes    Take 1 tablet by mouth As Needed.    Macitentan 10 MG tablet 6/7/2023 Self Yes Yes    Take 1 tablet by mouth Daily.    Mirabegron ER (MYRBETRIQ) 50 MG tablet sustained-release 24 hour 24 hr tablet 6/7/2023 Self Yes Yes    Take 50 mg by mouth Daily.    pantoprazole (PROTONIX) 40 MG EC tablet 6/8/2023 Self Yes Yes    Take 1 tablet by mouth Daily.    Riociguat (Adempas) 2.5 MG tablet 6/8/2023 Self Yes Yes    Take 1 tablet by mouth 3 (Three) Times a Day.    sertraline (ZOLOFT) 100 MG tablet 6/7/2023 Self Yes Yes    Take 1 tablet by mouth Daily.    tiZANidine (ZANAFLEX) 4 MG tablet 6/7/2023 Self Yes Yes    Take 1 tablet by mouth Every 8 (Eight) Hours As Needed for Muscle Spasms.    Treprostinil Diolamine ER 5 MG tablet controlled-release 6/8/2023 Self Yes Yes    Take 1 tablet by mouth 3 (Three) Times a Day.              Medication notes:     This medication list is complete to the best of my knowledge as of 6/8/2023    Please call if questions.    Ray Garcia  Medication History Technician    [phone]    6/8/2023 17:49 EDT

## 2023-06-08 NOTE — ED NOTES
Nursing report ED to floor  Cleo Menon  48 y.o.  female    HPI :   Chief Complaint   Patient presents with    Abdominal Pain       Admitting doctor:   Nikia Cormier MD    Admitting diagnosis:   The primary encounter diagnosis was Colitis. A diagnosis of Hypokalemia was also pertinent to this visit.    Code status:   Current Code Status       Date Active Code Status Order ID Comments User Context       6/8/2023 1849 CPR (Attempt to Resuscitate) [id number]  Nikia Cormier MD ED        Question Answer    Code Status (Patient has no pulse and is not breathing) CPR (Attempt to Resuscitate)    Medical Interventions (Patient has pulse or is breathing) Full Support                    Allergies:   Patient has no known allergies.    Intake and Output    Intake/Output Summary (Last 24 hours) at 6/8/2023 1931  Last data filed at 6/8/2023 1925  Gross per 24 hour   Intake 50 ml   Output --   Net 50 ml       Weight:   There were no vitals filed for this visit.    Most recent vitals:   Vitals:    06/08/23 1831 06/08/23 1832 06/08/23 1835 06/08/23 1836   BP: 121/49      Pulse: 87 88 87 89   Resp:       Temp:       SpO2:  94% 94% 94%       Active LDAs/IV Access:   Lines, Drains & Airways       Active LDAs       Name Placement date Placement time Site Days    Peripheral IV 06/08/23 1749 Anterior;Right Forearm 06/08/23 1749  Forearm  less than 1                    Labs (abnormal labs have a star):   Labs Reviewed   COMPREHENSIVE METABOLIC PANEL - Abnormal; Notable for the following components:       Result Value    Glucose 100 (*)     Potassium 3.0 (*)     All other components within normal limits    Narrative:     GFR Normal >60  Chronic Kidney Disease <60  Kidney Failure <15     URINALYSIS W/ MICROSCOPIC IF INDICATED (NO CULTURE) - Abnormal; Notable for the following components:    Ketones, UA 40 mg/dL (2+) (*)     All other components within normal limits    Narrative:     Urine microscopic not indicated.    CBC WITH AUTO DIFFERENTIAL - Abnormal; Notable for the following components:    Hemoglobin 11.1 (*)     Hematocrit 33.9 (*)     All other components within normal limits   LIPASE - Normal   PROTIME-INR - Normal   MAGNESIUM - Normal   RAINBOW DRAW    Narrative:     The following orders were created for panel order [place] Draw.  Procedure                               Abnormality         Status                     ---------                               -----------         ------                     Green Top (Gel)[[id number]]                                  Final result               Lavender Top[[id number]]                                     Final result               Gold Top - SST[[id number]]                                   Final result               Light Blue Top[[id number]]                                   Final result                 Please view results for these tests on the individual orders.   CBC AND DIFFERENTIAL    Narrative:     The following orders were created for panel order CBC & Differential.  Procedure                               Abnormality         Status                     ---------                               -----------         ------                     CBC Auto Differential[[id number]]        Abnormal            Final result                 Please view results for these tests on the individual orders.   GREEN TOP   LAVENDER TOP   GOLD TOP - SST   LIGHT BLUE TOP       EKG:   No orders to display       Meds given in ED:   Medications   sodium chloride 0.9 % flush 10 mL (has no administration in time range)   potassium chloride (KLOR-CON) packet 40 mEq (40 mEq Oral Not Given 6/8/23 1848)   acetaminophen (TYLENOL) tablet 650 mg (has no administration in time range)     Or   acetaminophen (TYLENOL) 160 MG/5ML solution 650 mg (has no administration in time range)     Or   acetaminophen (TYLENOL) suppository 650 mg (has no administration in time range)   sennosides-docusate (PERICOLACE)  8.6-50 MG per tablet 2 tablet (has no administration in time range)     And   polyethylene glycol (MIRALAX) packet 17 g (has no administration in time range)     And   bisacodyl (DULCOLAX) EC tablet 5 mg (has no administration in time range)     And   bisacodyl (DULCOLAX) suppository 10 mg (has no administration in time range)   ondansetron (ZOFRAN) injection 4 mg (has no administration in time range)   sodium chloride 0.9 % with KCl 20 mEq/L infusion (100 mL/hr Intravenous New Bag 6/8/23 1927)   sodium chloride 0.9 % bolus 1,000 mL (1,000 mL Intravenous New Bag 6/8/23 1753)   morphine injection 4 mg (4 mg Intravenous Given 6/8/23 1749)   ondansetron (ZOFRAN) injection 4 mg (4 mg Intravenous Given 6/8/23 1750)   piperacillin-tazobactam (ZOSYN) 3.375 g in iso-osmotic dextrose 50 ml (premix) (0 g Intravenous Stopped 6/8/23 1925)   potassium chloride (K-DUR,KLOR-CON) ER tablet 40 mEq (40 mEq Oral Given 6/8/23 1857)       Imaging results:  No radiology results for the last day    Ambulatory status:   - ad cipriano    Social issues:   Social History     Socioeconomic History    Marital status: Significant Other   Tobacco Use    Smoking status: Never    Smokeless tobacco: Never   Substance and Sexual Activity    Alcohol use: Never    Drug use: Never    Sexual activity: Defer       NIH Stroke Scale:        Nursing report ED to floor:

## 2023-06-09 PROBLEM — A08.4: Status: ACTIVE | Noted: 2023-06-09

## 2023-06-09 PROBLEM — R19.7 NAUSEA VOMITING AND DIARRHEA: Status: ACTIVE | Noted: 2023-06-09

## 2023-06-09 PROBLEM — E86.0 DEHYDRATION: Status: ACTIVE | Noted: 2023-06-09

## 2023-06-09 PROBLEM — E87.6 HYPOKALEMIA: Status: ACTIVE | Noted: 2023-06-09

## 2023-06-09 PROBLEM — R11.2 NAUSEA VOMITING AND DIARRHEA: Status: ACTIVE | Noted: 2023-06-09

## 2023-06-09 LAB
ANION GAP SERPL CALCULATED.3IONS-SCNC: 8.9 MMOL/L (ref 5–15)
BASOPHILS # BLD AUTO: 0.01 10*3/MM3 (ref 0–0.2)
BASOPHILS NFR BLD AUTO: 0.3 % (ref 0–1.5)
BUN SERPL-MCNC: 5 MG/DL (ref 6–20)
BUN/CREAT SERPL: 7.2 (ref 7–25)
CALCIUM SPEC-SCNC: 8.2 MG/DL (ref 8.6–10.5)
CHLORIDE SERPL-SCNC: 109 MMOL/L (ref 98–107)
CO2 SERPL-SCNC: 22.1 MMOL/L (ref 22–29)
CREAT SERPL-MCNC: 0.69 MG/DL (ref 0.57–1)
D-LACTATE SERPL-SCNC: 0.5 MMOL/L (ref 0.5–2)
DEPRECATED RDW RBC AUTO: 41.2 FL (ref 37–54)
EGFRCR SERPLBLD CKD-EPI 2021: 108.5 ML/MIN/1.73
EOSINOPHIL # BLD AUTO: 0.06 10*3/MM3 (ref 0–0.4)
EOSINOPHIL NFR BLD AUTO: 1.9 % (ref 0.3–6.2)
ERYTHROCYTE [DISTWIDTH] IN BLOOD BY AUTOMATED COUNT: 13.5 % (ref 12.3–15.4)
GLUCOSE SERPL-MCNC: 100 MG/DL (ref 65–99)
HCT VFR BLD AUTO: 30 % (ref 34–46.6)
HGB BLD-MCNC: 9.9 G/DL (ref 12–15.9)
IMM GRANULOCYTES # BLD AUTO: 0.01 10*3/MM3 (ref 0–0.05)
IMM GRANULOCYTES NFR BLD AUTO: 0.3 % (ref 0–0.5)
LYMPHOCYTES # BLD AUTO: 0.75 10*3/MM3 (ref 0.7–3.1)
LYMPHOCYTES NFR BLD AUTO: 24.2 % (ref 19.6–45.3)
MAGNESIUM SERPL-MCNC: 2 MG/DL (ref 1.6–2.6)
MCH RBC QN AUTO: 28.1 PG (ref 26.6–33)
MCHC RBC AUTO-ENTMCNC: 33 G/DL (ref 31.5–35.7)
MCV RBC AUTO: 85.2 FL (ref 79–97)
MONOCYTES # BLD AUTO: 0.25 10*3/MM3 (ref 0.1–0.9)
MONOCYTES NFR BLD AUTO: 8.1 % (ref 5–12)
NEUTROPHILS NFR BLD AUTO: 2.02 10*3/MM3 (ref 1.7–7)
NEUTROPHILS NFR BLD AUTO: 65.2 % (ref 42.7–76)
NRBC BLD AUTO-RTO: 0 /100 WBC (ref 0–0.2)
PHOSPHATE SERPL-MCNC: 2.5 MG/DL (ref 2.5–4.5)
PLATELET # BLD AUTO: 167 10*3/MM3 (ref 140–450)
PMV BLD AUTO: 10.6 FL (ref 6–12)
POTASSIUM SERPL-SCNC: 4.8 MMOL/L (ref 3.5–5.2)
RBC # BLD AUTO: 3.52 10*6/MM3 (ref 3.77–5.28)
SODIUM SERPL-SCNC: 140 MMOL/L (ref 136–145)
WBC NRBC COR # BLD: 3.1 10*3/MM3 (ref 3.4–10.8)

## 2023-06-09 PROCEDURE — 25010000002 SODIUM CHLORIDE 0.9 % WITH KCL 20 MEQ 20-0.9 MEQ/L-% SOLUTION: Performed by: INTERNAL MEDICINE

## 2023-06-09 PROCEDURE — G0378 HOSPITAL OBSERVATION PER HR: HCPCS

## 2023-06-09 PROCEDURE — 36415 COLL VENOUS BLD VENIPUNCTURE: CPT | Performed by: INTERNAL MEDICINE

## 2023-06-09 PROCEDURE — 96366 THER/PROPH/DIAG IV INF ADDON: CPT

## 2023-06-09 PROCEDURE — 80048 BASIC METABOLIC PNL TOTAL CA: CPT | Performed by: INTERNAL MEDICINE

## 2023-06-09 PROCEDURE — 25010000002 MORPHINE PER 10 MG: Performed by: NURSE PRACTITIONER

## 2023-06-09 PROCEDURE — 85025 COMPLETE CBC W/AUTO DIFF WBC: CPT | Performed by: INTERNAL MEDICINE

## 2023-06-09 PROCEDURE — 96376 TX/PRO/DX INJ SAME DRUG ADON: CPT

## 2023-06-09 PROCEDURE — 83735 ASSAY OF MAGNESIUM: CPT | Performed by: INTERNAL MEDICINE

## 2023-06-09 PROCEDURE — 96361 HYDRATE IV INFUSION ADD-ON: CPT

## 2023-06-09 PROCEDURE — 25010000002 PIPERACILLIN SOD-TAZOBACTAM PER 1 G: Performed by: INTERNAL MEDICINE

## 2023-06-09 PROCEDURE — 84100 ASSAY OF PHOSPHORUS: CPT | Performed by: INTERNAL MEDICINE

## 2023-06-09 RX ORDER — SODIUM CHLORIDE 9 MG/ML
100 INJECTION, SOLUTION INTRAVENOUS CONTINUOUS
Status: DISCONTINUED | OUTPATIENT
Start: 2023-06-09 | End: 2023-06-10 | Stop reason: HOSPADM

## 2023-06-09 RX ADMIN — SERTRALINE 100 MG: 100 TABLET, FILM COATED ORAL at 01:17

## 2023-06-09 RX ADMIN — SODIUM CHLORIDE 100 ML/HR: 9 INJECTION, SOLUTION INTRAVENOUS at 14:30

## 2023-06-09 RX ADMIN — TAZOBACTAM SODIUM AND PIPERACILLIN SODIUM 3.38 G: 375; 3 INJECTION, SOLUTION INTRAVENOUS at 08:23

## 2023-06-09 RX ADMIN — ACETAMINOPHEN 650 MG: 325 TABLET, FILM COATED ORAL at 23:23

## 2023-06-09 RX ADMIN — TAZOBACTAM SODIUM AND PIPERACILLIN SODIUM 3.38 G: 375; 3 INJECTION, SOLUTION INTRAVENOUS at 23:16

## 2023-06-09 RX ADMIN — SODIUM CHLORIDE 100 ML/HR: 9 INJECTION, SOLUTION INTRAVENOUS at 23:24

## 2023-06-09 RX ADMIN — TIZANIDINE 4 MG: 4 TABLET ORAL at 01:17

## 2023-06-09 RX ADMIN — POTASSIUM CHLORIDE AND SODIUM CHLORIDE 100 ML/HR: 900; 150 INJECTION, SOLUTION INTRAVENOUS at 05:08

## 2023-06-09 RX ADMIN — TAZOBACTAM SODIUM AND PIPERACILLIN SODIUM 3.38 G: 375; 3 INJECTION, SOLUTION INTRAVENOUS at 16:12

## 2023-06-09 RX ADMIN — DOCUSATE SODIUM 50MG AND SENNOSIDES 8.6MG 2 TABLET: 8.6; 5 TABLET, FILM COATED ORAL at 08:23

## 2023-06-09 RX ADMIN — TAZOBACTAM SODIUM AND PIPERACILLIN SODIUM 3.38 G: 375; 3 INJECTION, SOLUTION INTRAVENOUS at 01:23

## 2023-06-09 RX ADMIN — MORPHINE SULFATE 2 MG: 2 INJECTION, SOLUTION INTRAMUSCULAR; INTRAVENOUS at 12:25

## 2023-06-09 RX ADMIN — TIZANIDINE 4 MG: 4 TABLET ORAL at 23:23

## 2023-06-09 RX ADMIN — MORPHINE SULFATE 2 MG: 2 INJECTION, SOLUTION INTRAMUSCULAR; INTRAVENOUS at 03:05

## 2023-06-09 RX ADMIN — MORPHINE SULFATE 2 MG: 2 INJECTION, SOLUTION INTRAMUSCULAR; INTRAVENOUS at 21:09

## 2023-06-09 RX ADMIN — SERTRALINE 100 MG: 100 TABLET, FILM COATED ORAL at 21:09

## 2023-06-09 RX ADMIN — POTASSIUM CHLORIDE 40 MEQ: 750 TABLET, EXTENDED RELEASE ORAL at 03:06

## 2023-06-09 NOTE — PLAN OF CARE
Goal Outcome Evaluation:  Plan of Care Reviewed With: patient        Progress: no change  Outcome Evaluation: c/o gen abdominal pain.  MOrphine given for pain.  IVF  and IV zosyn given.  PO potassium given .  labs odered.  assist x 1.   to monitor for KCL, mag, PO4 and calcium level per protocol

## 2023-06-09 NOTE — PLAN OF CARE
Goal Outcome Evaluation:  Plan of Care Reviewed With: patient        Progress: no change  Outcome Evaluation: Patient c/o constant abdominal pain. IV Morphine given PRN with some relief. Clear liquid diet, may be advanced as tolerated. IVF changed to NS, IV antibiotic given. On electrolyte protocols, WNL this AM. VSS on room air. Stand-by assist up.

## 2023-06-09 NOTE — ED PROVIDER NOTES
MD ATTESTATION NOTE    The ASH[facility] and I have discussed this patient's history, physical exam, and treatment plan.    I provided a substantive portion of the care of this patient. I personally performed the physical exam, in its entirety. The attached note describes my personal findings.      Cleo Menon is a 48 y.o. female who presents to the ED c/o abdominal pain.  She describes this as cramping.  Onset about 1 week ago.      On exam:  GENERAL: not distressed  HENT: nares patent  EYES: no scleral icterus  CV: regular rhythm, regular rate  RESPIRATORY: normal effort  ABDOMEN: soft mild lower abdominal tenderness without rebound or guarding  MUSCULOSKELETAL: no deformity  NEURO: alert, moves all extremities, follows commands  SKIN: warm, dry    Labs  Recent Results (from the past 24 hour(s))   Comprehensive Metabolic Panel    Collection Time: 06/08/23  5:50 PM    Specimen: Blood   Result Value Ref Range    Glucose 100 (H) 65 - 99 mg/dL    BUN 6 6 - 20 mg/dL    Creatinine 0.70 0.57 - 1.00 mg/dL    Sodium 142 136 - 145 mmol/L    Potassium 3.0 (L) 3.5 - 5.2 mmol/L    Chloride 105 98 - 107 mmol/L    CO2 22.2 22.0 - 29.0 mmol/L    Calcium 9.3 8.6 - 10.5 mg/dL    Total Protein 7.6 6.0 - 8.5 g/dL    Albumin 4.6 3.5 - 5.2 g/dL    ALT (SGPT) 25 1 - 33 U/L    AST (SGOT) 20 1 - 32 U/L    Alkaline Phosphatase 65 39 - 117 U/L    Total Bilirubin 0.8 0.0 - 1.2 mg/dL    Globulin 3.0 gm/dL    A/G Ratio 1.5 g/dL    BUN/Creatinine Ratio 8.6 7.0 - 25.0    Anion Gap 14.8 5.0 - 15.0 mmol/L    eGFR 108.2 >60.0 mL/min/1.73   Lipase    Collection Time: 06/08/23  5:50 PM    Specimen: Blood   Result Value Ref Range    Lipase 17 13 - 60 U/L   Protime-INR    Collection Time: 06/08/23  5:50 PM    Specimen: Blood   Result Value Ref Range    Protime 13.2 11.7 - 14.2 Seconds    INR 0.99 0.90 - 1.10   Green Top (Gel)    Collection Time: 06/08/23  5:50 PM   Result Value Ref Range    Extra Tube Hold for add-ons.    Lavender Top    Collection Time:  06/08/23  5:50 PM   Result Value Ref Range    Extra Tube hold for add-on    Gold Top - SST    Collection Time: 06/08/23  5:50 PM   Result Value Ref Range    Extra Tube Hold for add-ons.    Light Blue Top    Collection Time: 06/08/23  5:50 PM   Result Value Ref Range    Extra Tube Hold for add-ons.    CBC Auto Differential    Collection Time: 06/08/23  5:50 PM    Specimen: Blood   Result Value Ref Range    WBC 3.81 3.40 - 10.80 10*3/mm3    RBC 3.92 3.77 - 5.28 10*6/mm3    Hemoglobin 11.1 (L) 12.0 - 15.9 g/dL    Hematocrit 33.9 (L) 34.0 - 46.6 %    MCV 86.5 79.0 - 97.0 fL    MCH 28.3 26.6 - 33.0 pg    MCHC 32.7 31.5 - 35.7 g/dL    RDW 13.6 12.3 - 15.4 %    RDW-SD 42.3 37.0 - 54.0 fl    MPV 10.1 6.0 - 12.0 fL    Platelets 195 140 - 450 10*3/mm3    Neutrophil % 68.0 42.7 - 76.0 %    Lymphocyte % 22.6 19.6 - 45.3 %    Monocyte % 7.3 5.0 - 12.0 %    Eosinophil % 1.3 0.3 - 6.2 %    Basophil % 0.3 0.0 - 1.5 %    Immature Grans % 0.5 0.0 - 0.5 %    Neutrophils, Absolute 2.59 1.70 - 7.00 10*3/mm3    Lymphocytes, Absolute 0.86 0.70 - 3.10 10*3/mm3    Monocytes, Absolute 0.28 0.10 - 0.90 10*3/mm3    Eosinophils, Absolute 0.05 0.00 - 0.40 10*3/mm3    Basophils, Absolute 0.01 0.00 - 0.20 10*3/mm3    Immature Grans, Absolute 0.02 0.00 - 0.05 10*3/mm3    nRBC 0.0 0.0 - 0.2 /100 WBC   Magnesium    Collection Time: 06/08/23  5:50 PM    Specimen: Blood   Result Value Ref Range    Magnesium 2.2 1.6 - 2.6 mg/dL   Urinalysis With Microscopic If Indicated (No Culture) - Urine, Clean Catch    Collection Time: 06/08/23  5:51 PM    Specimen: Urine, Clean Catch   Result Value Ref Range    Color, UA Yellow Yellow, Straw    Appearance, UA Clear Clear    pH, UA 6.0 5.0 - 8.0    Specific Gravity, UA 1.012 1.005 - 1.030    Glucose, UA Negative Negative    Ketones, UA 40 mg/dL (2+) (A) Negative    Bilirubin, UA Negative Negative    Blood, UA Negative Negative    Protein, UA Negative Negative    Leuk Esterase, UA Negative Negative    Nitrite, UA  Negative Negative    Urobilinogen, UA 0.2 E.U./dL 0.2 - 1.0 E.U./dL       Radiology  No Radiology Exams Resulted Within Past 24 Hours    Medications given in the ED:  Medications   sodium chloride 0.9 % flush 10 mL (has no administration in time range)   acetaminophen (TYLENOL) tablet 650 mg (has no administration in time range)     Or   acetaminophen (TYLENOL) 160 MG/5ML solution 650 mg (has no administration in time range)     Or   acetaminophen (TYLENOL) suppository 650 mg (has no administration in time range)   sennosides-docusate (PERICOLACE) 8.6-50 MG per tablet 2 tablet (2 tablets Oral Given 6/8/23 2212)     And   polyethylene glycol (MIRALAX) packet 17 g (has no administration in time range)     And   bisacodyl (DULCOLAX) EC tablet 5 mg (has no administration in time range)     And   bisacodyl (DULCOLAX) suppository 10 mg (has no administration in time range)   ondansetron (ZOFRAN) injection 4 mg (has no administration in time range)   sodium chloride 0.9 % with KCl 20 mEq/L infusion (100 mL/hr Intravenous New Bag 6/8/23 1927)   Potassium Replacement - Follow Nurse / BPA Driven Protocol (has no administration in time range)   Magnesium Standard Dose Replacement - Follow Nurse / BPA Driven Protocol (has no administration in time range)   Phosphorus Replacement - Follow Nurse / BPA Driven Protocol (has no administration in time range)   Calcium Replacement - Follow Nurse / BPA Driven Protocol (has no administration in time range)   Macitentan tablet 10 mg (has no administration in time range)   Non-Formulary / Patient Supplied Medication (has no administration in time range)   tiZANidine (ZANAFLEX) tablet 4 mg (has no administration in time range)   Treprostinil Diolamine ER tablet controlled-release 5 mg (has no administration in time range)   morphine injection 2 mg (2 mg Intravenous Given 6/8/23 2207)   sertraline (ZOLOFT) tablet 100 mg (has no administration in time range)   piperacillin-tazobactam (ZOSYN)  3.375 g in iso-osmotic dextrose 50 ml (premix) (has no administration in time range)   potassium chloride (K-DUR,KLOR-CON) ER tablet 40 mEq (40 mEq Oral Given 6/8/23 2326)   sodium chloride 0.9 % bolus 1,000 mL (1,000 mL Intravenous New Bag 6/8/23 1753)   morphine injection 4 mg (4 mg Intravenous Given 6/8/23 1749)   ondansetron (ZOFRAN) injection 4 mg (4 mg Intravenous Given 6/8/23 1750)   piperacillin-tazobactam (ZOSYN) 3.375 g in iso-osmotic dextrose 50 ml (premix) (0 g Intravenous Stopped 6/8/23 1925)   potassium chloride (K-DUR,KLOR-CON) ER tablet 40 mEq (40 mEq Oral Given 6/8/23 1857)       Orders placed during this visit:  Orders Placed This Encounter   Procedures    Blood Culture - Blood,    Blood Culture - Blood,    [place] Pl[place] Draw    Comprehensive Metabolic Panel    Lipase    Urinalysis With Microscopic If Indicated (No Culture) - Urine, Clean Catch    Protime-INR    CBC Auto Differential    Magnesium    Basic Metabolic Panel    CBC Auto Differential    Lactic Acid, Plasma    Magnesium    Phosphorus    Diet: Liquid Diets; Clear Liquid; Texture: Regular Texture (IDDSI 7); Fluid Consistency: Thin (IDDSI 0)    Vital Signs    Up With Assistance    Intake & Output    Oral Care    Place Sequential Compression Device    Maintain Sequential Compression Device    Code Status and Medical Interventions:    [facility]HA (on-call MD unless specified) Details    Inpatient Spiritual Care Consult    Insert Peripheral IV    Initiate Observation Status    CBC & Differential    Green Top (Gel)    Lavender Top    Gold Top - SST    Light Blue Top       Medical Decision Making:  ED Course as of 06/09/23 0039   Thu Jun 08, 2023   1833 Phone call with dr. morrison.  Discussed the patient, relevant history, exam, diagnostics, ED findings/progress, and concerns. They agree to admit to a MS bed   []      ED Course User Index  [] Yessy Charlton APRN             PPE: Both the patient and I wore a surgical mask throughout the entire patient  encounter.     Diagnosis  Final diagnoses:   Colitis   Hypokalemia          Rafal Hummel II, MD  06/09/23 0047

## 2023-06-09 NOTE — CONSULTS
Visited with patient and family initially, then followed up with patient. She is experiencing grief from the death of her sister earlier this year. She has strong family support and relies on her deep onesimo for support also.

## 2023-06-09 NOTE — H&P
HISTORY AND PHYSICAL   [facility]        Date of Admission: 2023  Patient Identification:  Name: Cleo Menon  Age: 48 y.o.  Sex: female  :  1975  MRN: [id number]                     Primary Care Physician: Rm Tidwell MD    Chief Complaint:  48 year old female who presented to the emergency room with abdominal pain, nausea and vomiting which started several days ago; she was seen in the ED and sent home with antibiotics; she has not been able to keep them down and has done poorly; she has continued to have nausea and vomiting as well as diarrhea; she has been feeling weak as well     History of Present Illness:   As above    Past Medical History:  Past Medical History:   Diagnosis Date    Enlarged heart     Hypertension     Pulmonary hypertension     VSD (ventricular septal defect)     repair done      Past Surgical History:  Past Surgical History:   Procedure Laterality Date     SECTION      CHOLECYSTECTOMY      LUMBAR SPINAL CORD TUMOR REMOVAL      VSD REPAIR        Home Meds:  Medications Prior to Admission   Medication Sig Dispense Refill Last Dose    amoxicillin-clavulanate (AUGMENTIN) 875-125 MG per tablet Take 1 tablet by mouth 2 (Two) Times a Day. 14 tablet 0 2023    ferrous sulfate 325 (65 FE) MG tablet Take 1 tablet by mouth Daily As Needed.       HYDROcodone-acetaminophen (NORCO) 7.5-325 MG per tablet Take 1 tablet by mouth Every 8 (Eight) Hours As Needed.       ibuprofen (ADVIL,MOTRIN) 600 MG tablet Take 1 tablet by mouth As Needed.       Macitentan 10 MG tablet Take 1 tablet by mouth Daily.   2023    Mirabegron ER (MYRBETRIQ) 50 MG tablet sustained-release 24 hour 24 hr tablet Take 50 mg by mouth Daily.   2023    pantoprazole (PROTONIX) 40 MG EC tablet Take 1 tablet by mouth Daily.   2023    Riociguat (Adempas) 2.5 MG tablet Take 1 tablet by mouth 3 (Three) Times a Day.   2023    sertraline (ZOLOFT) 100 MG tablet Take 1 tablet by mouth  Daily.   2023    tiZANidine (ZANAFLEX) 4 MG tablet Take 1 tablet by mouth Every 8 (Eight) Hours As Needed for Muscle Spasms.   2023    Treprostinil Diolamine ER 5 MG tablet controlled-release Take 1 tablet by mouth 3 (Three) Times a Day.   2023       Allergies:  No Known Allergies  Immunizations:  There is no immunization history for the selected administration types on file for this patient.  Social History:   Social History     Social History Narrative    Not on file     Social History     Socioeconomic History    Marital status: Significant Other   Tobacco Use    Smoking status: Never    Smokeless tobacco: Never   Substance and Sexual Activity    Alcohol use: Never    Drug use: Never    Sexual activity: Defer       Family History:  History reviewed. No pertinent family history.     Review of Systems  See history of present illness and past medical history.  Patient denies headache, dizziness, syncope, falls, trauma, change in vision, change in hearing, change in taste, changes in weight, changes in appetite, focal weakness, numbness, or paresthesia.  Patient denies chest pain, palpitations, dyspnea, orthopnea, PND, cough, sinus pressure, rhinorrhea, epistaxis, hemoptysis, nausea, vomiting,hematemesis, diarrhea, constipation or hematochezia.  Denies cold or heat intolerance, polydipsia, polyuria, polyphagia. Denies hematuria, pyuria, dysuria, hesitancy, frequency or urgency. Denies consumption of raw and under cooked meats foods or change in water source.  Denies fever, chills, sweats, night sweats.  Denies missing any routine medications. Remainder of ROS is negative.    Objective:  T Max 24 hrs: Temp (24hrs), Av.4 °F (36.9 °C), Min:97.6 °F (36.4 °C), Max:99.1 °F (37.3 °C)    Vitals Ranges:   Temp:  [97.6 °F (36.4 °C)-99.1 °F (37.3 °C)] 97.6 °F (36.4 °C)  Heart Rate:  [] 96  Resp:  [18-20] 18  BP: (121-139)/(49-78) 139/77      Exam:  /77 (BP Location: Right arm, Patient Position:  "Lying)   Pulse 96   Temp 97.6 °F (36.4 °C) (Oral)   Resp 18   Ht 157.5 cm (62\")   Wt 91.1 kg (200 lb 13.4 oz)   SpO2 92%   BMI 36.73 kg/m²     General Appearance:    Alert, cooperative, no distress, appears stated age   Head:    Normocephalic, without obvious abnormality, atraumatic   Eyes:    PERRL, conjunctivae/corneas clear, EOM's intact, both eyes   Ears:    Normal external ear canals, both ears   Nose:   Nares normal, septum midline, mucosa normal, no drainage    or sinus tenderness   Throat:   Lips, mucosa, and tongue normal   Neck:   Supple, symmetrical, trachea midline, no adenopathy;     thyroid:  no enlargement/tenderness/nodules; no carotid    bruit or JVD   Back:     Symmetric, no curvature, ROM normal, no CVA tenderness   Lungs:     Decreased breath sounds bilaterally, respirations unlabored   Chest Wall:    No tenderness or deformity    Heart:    Regular rate and rhythm, S1 and S2 normal, no murmur, rub   or gallop   Abdomen:     Soft, nontender, bowel sounds active all four quadrants,     no masses, no hepatomegaly, no splenomegaly   Extremities:   Extremities normal, atraumatic, no cyanosis or edema   Pulses:   2+ and symmetric all extremities   Skin:   Skin color, texture, turgor normal, no rashes or lesions               .    Data Review:  WBC   Date Value Ref Range Status   06/08/2023 3.81 3.40 - 10.80 10*3/mm3 Final     Hemoglobin   Date Value Ref Range Status   06/08/2023 11.1 (L) 12.0 - 15.9 g/dL Final     Hematocrit   Date Value Ref Range Status   06/08/2023 33.9 (L) 34.0 - 46.6 % Final     Platelets   Date Value Ref Range Status   06/08/2023 195 140 - 450 10*3/mm3 Final     Sodium   Date Value Ref Range Status   06/08/2023 142 136 - 145 mmol/L Final     Potassium   Date Value Ref Range Status   06/08/2023 3.0 (L) 3.5 - 5.2 mmol/L Final     Chloride   Date Value Ref Range Status   06/08/2023 105 98 - 107 mmol/L Final     CO2   Date Value Ref Range Status   06/08/2023 22.2 22.0 - 29.0 " mmol/L Final     BUN   Date Value Ref Range Status   06/08/2023 6 6 - 20 mg/dL Final     Creatinine   Date Value Ref Range Status   06/08/2023 0.70 0.57 - 1.00 mg/dL Final     Glucose   Date Value Ref Range Status   06/08/2023 100 (H) 65 - 99 mg/dL Final     Calcium   Date Value Ref Range Status   06/08/2023 9.3 8.6 - 10.5 mg/dL Final     Magnesium   Date Value Ref Range Status   06/08/2023 2.2 1.6 - 2.6 mg/dL Final     AST (SGOT)   Date Value Ref Range Status   06/08/2023 20 1 - 32 U/L Final     ALT (SGPT)   Date Value Ref Range Status   06/08/2023 25 1 - 33 U/L Final     Alkaline Phosphatase   Date Value Ref Range Status   06/08/2023 65 39 - 117 U/L Final                Imaging Results (All)       None              Assessment:  Active Hospital Problems    Diagnosis  POA    **Colitis [K52.9]  Yes      Resolved Hospital Problems   No resolved problems to display.   Nausea and vomiting  Diarrhea  Hypokalemia  Abdominal pain      Plan:  Continue antibiotics, fluids  Antiemetics  Clear liquid diet  Replace potassium  Dw patient and ed provider    Nikia Cormier MD  6/8/2023  21:39 EDT

## 2023-06-09 NOTE — PROGRESS NOTES
"    DAILY PROGRESS NOTE  [facility]    Patient Identification:  Name: Cleo Menon  Age: 48 y.o.  Sex: female  :  1975  MRN: [id number]         Primary Care Physician: Rm Tidwell MD    Subjective:  Interval History: Still feels quite ill.  Still nauseous but no further emesis since admission to the hospital.  She started to have loose stools as well.  We discussed recent travels and she just came back from [place] when she transferred there via plane flight.  Denies any cough shortness of breath chest pain    Objective: Sleeping soundly upon entering the room he is.  He easily aroused and conversational.  Nontoxic in appearance    Scheduled Meds:Macitentan, 10 mg, Oral, Nightly  piperacillin-tazobactam, 3.375 g, Intravenous, Q8H  Riociguat, 2.5 mg, Oral, TID  senna-docusate sodium, 2 tablet, Oral, BID  sertraline, 100 mg, Oral, Nightly  Treprostinil Diolamine ER, 5 mg, Oral, TID      Continuous Infusions:sodium chloride 0.9 % with KCl 20 mEq, 100 mL/hr, Last Rate: 100 mL/hr (23 0946)        Vital signs in last 24 hours:  Temp:  [97.6 °F (36.4 °C)-99.1 °F (37.3 °C)] 98.2 °F (36.8 °C)  Heart Rate:  [] 76  Resp:  [18-20] 18  BP: (100-139)/(49-78) 100/59    Intake/Output:    Intake/Output Summary (Last 24 hours) at 2023 1102  Last data filed at 2023 0823  Gross per 24 hour   Intake 1550 ml   Output 900 ml   Net 650 ml       Exam:  /59 (BP Location: Left arm, Patient Position: Lying)   Pulse 76   Temp 98.2 °F (36.8 °C) (Oral)   Resp 18   Ht 157.5 cm (62\")   Wt 91.1 kg (200 lb 13.4 oz)   SpO2 91%   BMI 36.73 kg/m²     General Appearance:    Alert, cooperative, AAOx3                          Head:    Normocephalic, without obvious abnormality, atraumatic                           Eyes:    No scleral icterus/EOMI intact                         Throat:   Oral mucosa pink and rightly dry                           Neck:   No JVD                         Lungs: "    Clear to auscultation bilaterally, respirations unlabored                 Chest Wall:    No tenderness or deformity                          Heart:    Regular rate and rhythm, S1 and S2 normal                  Abdomen:     Soft, significant tenderness to palpation and certainly no rebound or guarding, bowel sounds active                 Extremities:   Generalized weakness though moving all, no cyanosis or edema                        Pulses:   Pulses palpable in lower extremities                            Skin:   Skin is warm and dry, nonjaundiced                  Neurologic:   CNII-XII intact       Data Review:  Labs in chart were reviewed.    Assessment:  Active Hospital Problems    Diagnosis  POA    **Colitis [K52.9]  Yes      Resolved Hospital Problems   No resolved problems to display.       Plan:    I believe her symptoms are all due to viral etiology given her recent travel out [place] via air flight and CBC demonstrating leukocytopenia.  LFTs normal    Supportive care with IVF and gut rest.  No need GI consult    Currently on Zosyn and will continue broad-spectrum antibiotic for now as we await stool studies before we rule out any bacterial etiology.  No sepsis at admission    Potassium replaced    Back mag with next lab draw    GERD on PPI    Depression/anxiety on Zoloft    Graham Edsno Hagen MD  6/9/2023  11:02 EDT

## 2023-06-10 VITALS
TEMPERATURE: 98.1 F | BODY MASS INDEX: 36.96 KG/M2 | SYSTOLIC BLOOD PRESSURE: 114 MMHG | OXYGEN SATURATION: 91 % | WEIGHT: 200.84 LBS | DIASTOLIC BLOOD PRESSURE: 65 MMHG | RESPIRATION RATE: 18 BRPM | HEART RATE: 87 BPM | HEIGHT: 62 IN

## 2023-06-10 LAB
ADV 40+41 DNA STL QL NAA+NON-PROBE: NOT DETECTED
ANION GAP SERPL CALCULATED.3IONS-SCNC: 7.4 MMOL/L (ref 5–15)
ASTRO TYP 1-8 RNA STL QL NAA+NON-PROBE: NOT DETECTED
BUN SERPL-MCNC: 4 MG/DL (ref 6–20)
BUN/CREAT SERPL: 6.3 (ref 7–25)
C CAYETANENSIS DNA STL QL NAA+NON-PROBE: NOT DETECTED
C COLI+JEJ+UPSA DNA STL QL NAA+NON-PROBE: NOT DETECTED
CALCIUM SPEC-SCNC: 8.5 MG/DL (ref 8.6–10.5)
CHLORIDE SERPL-SCNC: 108 MMOL/L (ref 98–107)
CO2 SERPL-SCNC: 23.6 MMOL/L (ref 22–29)
CREAT SERPL-MCNC: 0.63 MG/DL (ref 0.57–1)
CRYPTOSP DNA STL QL NAA+NON-PROBE: NOT DETECTED
DEPRECATED RDW RBC AUTO: 40.9 FL (ref 37–54)
E HISTOLYT DNA STL QL NAA+NON-PROBE: NOT DETECTED
EAEC PAA PLAS AGGR+AATA ST NAA+NON-PRB: NOT DETECTED
EC STX1+STX2 GENES STL QL NAA+NON-PROBE: NOT DETECTED
EGFRCR SERPLBLD CKD-EPI 2021: 111 ML/MIN/1.73
EPEC EAE GENE STL QL NAA+NON-PROBE: NOT DETECTED
ERYTHROCYTE [DISTWIDTH] IN BLOOD BY AUTOMATED COUNT: 13.4 % (ref 12.3–15.4)
ETEC LTA+ST1A+ST1B TOX ST NAA+NON-PROBE: NOT DETECTED
G LAMBLIA DNA STL QL NAA+NON-PROBE: NOT DETECTED
GLUCOSE SERPL-MCNC: 98 MG/DL (ref 65–99)
HCT VFR BLD AUTO: 29.1 % (ref 34–46.6)
HGB BLD-MCNC: 9.7 G/DL (ref 12–15.9)
MAGNESIUM SERPL-MCNC: 2 MG/DL (ref 1.6–2.6)
MCH RBC QN AUTO: 28.5 PG (ref 26.6–33)
MCHC RBC AUTO-ENTMCNC: 33.3 G/DL (ref 31.5–35.7)
MCV RBC AUTO: 85.6 FL (ref 79–97)
NOROVIRUS GI+II RNA STL QL NAA+NON-PROBE: NOT DETECTED
P SHIGELLOIDES DNA STL QL NAA+NON-PROBE: NOT DETECTED
PLATELET # BLD AUTO: 171 10*3/MM3 (ref 140–450)
PMV BLD AUTO: 11.5 FL (ref 6–12)
POTASSIUM SERPL-SCNC: 3.7 MMOL/L (ref 3.5–5.2)
RBC # BLD AUTO: 3.4 10*6/MM3 (ref 3.77–5.28)
RVA RNA STL QL NAA+NON-PROBE: NOT DETECTED
S ENT+BONG DNA STL QL NAA+NON-PROBE: NOT DETECTED
SAPO I+II+IV+V RNA STL QL NAA+NON-PROBE: NOT DETECTED
SHIGELLA SP+EIEC IPAH ST NAA+NON-PROBE: NOT DETECTED
SODIUM SERPL-SCNC: 139 MMOL/L (ref 136–145)
V CHOL+PARA+VUL DNA STL QL NAA+NON-PROBE: NOT DETECTED
V CHOLERAE DNA STL QL NAA+NON-PROBE: NOT DETECTED
WBC NRBC COR # BLD: 2.79 10*3/MM3 (ref 3.4–10.8)
Y ENTEROCOL DNA STL QL NAA+NON-PROBE: NOT DETECTED

## 2023-06-10 PROCEDURE — 96361 HYDRATE IV INFUSION ADD-ON: CPT

## 2023-06-10 PROCEDURE — 96366 THER/PROPH/DIAG IV INF ADDON: CPT

## 2023-06-10 PROCEDURE — 80048 BASIC METABOLIC PNL TOTAL CA: CPT | Performed by: HOSPITALIST

## 2023-06-10 PROCEDURE — G0378 HOSPITAL OBSERVATION PER HR: HCPCS

## 2023-06-10 PROCEDURE — 87507 IADNA-DNA/RNA PROBE TQ 12-25: CPT | Performed by: HOSPITALIST

## 2023-06-10 PROCEDURE — 85027 COMPLETE CBC AUTOMATED: CPT | Performed by: HOSPITALIST

## 2023-06-10 PROCEDURE — 25010000002 PIPERACILLIN SOD-TAZOBACTAM PER 1 G: Performed by: INTERNAL MEDICINE

## 2023-06-10 PROCEDURE — 83735 ASSAY OF MAGNESIUM: CPT | Performed by: HOSPITALIST

## 2023-06-10 RX ADMIN — SODIUM CHLORIDE 100 ML/HR: 9 INJECTION, SOLUTION INTRAVENOUS at 08:23

## 2023-06-10 RX ADMIN — TAZOBACTAM SODIUM AND PIPERACILLIN SODIUM 3.38 G: 375; 3 INJECTION, SOLUTION INTRAVENOUS at 08:22

## 2023-06-10 NOTE — DISCHARGE SUMMARY
[facility]    Date of Discharge:  6/10/2023    PCP: Rm Tidwell MD    Discharge Diagnosis:   Active Hospital Problems    Diagnosis  POA    **Viral colitis [A08.4]  Unknown    Dehydration [E86.0]  Unknown    Nausea vomiting and diarrhea [R11.2, R19.7]  Unknown    Hypokalemia [E87.6]  Unknown      Resolved Hospital Problems   No resolved problems to display.          Consults       Date and Time Order Name Status Description    6/8/2023  6:26 PM [facility] (on-call MD unless specified) Details            Hospital Course  48 y.o. female initially admitted for abdominal pain nausea and vomiting that was going on for several days prior to admission.  She came to the ER and sent home with antibiotics.  Patient did not feel she was getting better so she came back to the hospital.  I think the reason why she was not getting better with antibiotics is because this is all viral in etiology.  She had recent trip to [place] via air flight and this is most likely a viral gastroenteritis further supported by leukocytopenia noted on CBC.  LFTs otherwise were normal.  Patient was treated with supportive care and got rest with dietary advancement as tolerated.  There is no need for GI consultation on this admission we did keep on broad-spectrum Zosyn and I would not continue any antibiotics postdischarge as I do not feel this is bacterial etiology and there was no signs of sepsis at admission.  Electrolyte imbalances have been replaced.  Patient inquired about CKD on day of discharge and I informed her I do not know what she is talking about and I have not dealt with any abnormal kidney function on this admission and deferred further discussion with PCP as patient claims she read it somewhere in my chart.  Regardless vital signs are stable and patient clinically looks more than stable with no aspects of nausea vomiting though she is having some loose stools.  Anticipate  patient will continue to clinically improve over time and she has been advised to continue supportive care and keeping p.o. fluids intake elevated for now.  Patient understands and accepts all and is amenable to the above plan.      Temp:  [97.7 °F (36.5 °C)-98.3 °F (36.8 °C)] 98.1 °F (36.7 °C)  Heart Rate:  [78-96] 87  Resp:  [16-18] 18  BP: (102-118)/(63-65) 114/65  Body mass index is 36.73 kg/m².    Physical Exam  Constitutional:       Comments: Nontoxic.  No family present   HENT:      Head: Normocephalic.      Nose: Nose normal.      Mouth/Throat:      Mouth: Mucous membranes are moist.      Pharynx: Oropharynx is clear.   Cardiovascular:      Rate and Rhythm: Normal rate and regular rhythm.   Pulmonary:      Effort: Pulmonary effort is normal. No respiratory distress.      Breath sounds: Normal breath sounds.   Abdominal:      General: Bowel sounds are normal. There is no distension.      Palpations: Abdomen is soft.      Tenderness: There is no abdominal tenderness. There is no guarding or rebound.   Skin:     General: Skin is warm and dry.      Coloration: Skin is not jaundiced.   Neurological:      General: No focal deficit present.      Mental Status: She is alert and oriented to person, place, and time. Mental status is at baseline.     Disposition: Home or Self Care       Discharge Medications        Continue These Medications        Instructions Start Date   Adempas 2.5 MG tablet  Generic drug: Riociguat   2.5 mg, Oral, 3 Times Daily      ferrous sulfate 325 (65 FE) MG tablet   325 mg, Oral, Daily PRN      HYDROcodone-acetaminophen 7.5-325 MG per tablet  Commonly known as: NORCO   1 tablet, Oral, Every 8 Hours PRN      Macitentan 10 MG tablet   10 mg, Oral, Daily      Mirabegron ER 50 MG tablet sustained-release 24 hour 24 hr tablet  Commonly known as: MYRBETRIQ   Take 50 mg by mouth Daily.      pantoprazole 40 MG EC tablet  Commonly known as: PROTONIX   40 mg, Oral, Daily      sertraline 100 MG  tablet  Commonly known as: ZOLOFT   100 mg, Oral, Daily      tiZANidine 4 MG tablet  Commonly known as: ZANAFLEX   Take 1 tablet by mouth Every 8 (Eight) Hours As Needed for Muscle Spasms.      Treprostinil Diolamine ER 5 MG tablet controlled-release   5 mg, Oral, 3 Times Daily             Stop These Medications      amoxicillin-clavulanate 875-125 MG per tablet  Commonly known as: AUGMENTIN     ibuprofen 600 MG tablet  Commonly known as: ADVIL,MOTRIN               Additional Instructions for the Follow-ups that You Need to Schedule       Discharge Follow-up with PCP   As directed       Currently Documented PCP:    Rm Tidwell MD    PCP Phone Number:    [phone]     Follow Up Details: PCP 2 weeks                Follow-up Information       Rm Tidwell MD .    Specialty: Internal Medicine  Why: PCP 2 weeks  Contact information:  [address] [facility] [address]  [phone]                            Future Appointments   Date Time Provider Department Center   8/21/2023 10:00 AM Katy Mercer, [facility]     Pending Labs       Order Current Status    Blood Culture - Blood, Arm, Left Preliminary result    Blood Culture - Blood, Arm, Right Preliminary result           Graham Hagen MD  [facility]  06/10/23    Discharge time spent greater than 30 minutes.

## 2023-06-10 NOTE — PLAN OF CARE
Goal Outcome Evaluation:  Plan of Care Reviewed With: patient           Outcome Evaluation: VSS, IV fluids cont. , c/o abd pain, IV morphine given, prn, Luly Full liq diet, IV anitbx cont. , cont. Electrolye protocols, labs in am.

## 2023-06-12 NOTE — PROGRESS NOTES
Case Management Discharge Note      Final Note: Discharged home. Cris Hernandez, [employer]             Transportation Services  Private: Car    Final Discharge Disposition Code: 01 - home or self-care

## 2023-06-14 LAB
BACTERIA SPEC AEROBE CULT: NORMAL
BACTERIA SPEC AEROBE CULT: NORMAL

## 2023-08-03 ENCOUNTER — HOSPITAL ENCOUNTER (EMERGENCY)
Facility: HOSPITAL | Age: 48
Discharge: HOME | End: 2023-08-03
Attending: EMERGENCY MEDICINE
Payer: MEDICARE

## 2023-08-03 ENCOUNTER — APPOINTMENT (OUTPATIENT)
Dept: CT IMAGING | Facility: HOSPITAL | Age: 48
End: 2023-08-03
Payer: MEDICARE

## 2023-08-03 VITALS
BODY MASS INDEX: 34.36 KG/M2 | HEIGHT: 63 IN | SYSTOLIC BLOOD PRESSURE: 145 MMHG | RESPIRATION RATE: 20 BRPM | TEMPERATURE: 99.3 F | HEART RATE: 79 BPM | OXYGEN SATURATION: 100 % | DIASTOLIC BLOOD PRESSURE: 72 MMHG | WEIGHT: 193.9 LBS

## 2023-08-03 DIAGNOSIS — R19.7 DIARRHEA, UNSPECIFIED TYPE: ICD-10-CM

## 2023-08-03 DIAGNOSIS — R10.84 GENERALIZED ABDOMINAL PAIN: Primary | ICD-10-CM

## 2023-08-03 LAB
ADV 40+41 DNA STL QL NAA+NON-PROBE: NOT DETECTED
ALBUMIN SERPL-MCNC: 4.7 G/DL (ref 3.5–5.2)
ALBUMIN/GLOB SERPL: 2 G/DL
ALP SERPL-CCNC: 68 U/L (ref 39–117)
ALT SERPL W P-5'-P-CCNC: 17 U/L (ref 1–33)
ANION GAP SERPL CALCULATED.3IONS-SCNC: 13 MMOL/L (ref 5–15)
AST SERPL-CCNC: 19 U/L (ref 1–32)
ASTRO TYP 1-8 RNA STL QL NAA+NON-PROBE: NOT DETECTED
BASOPHILS # BLD AUTO: 0.02 10*3/MM3 (ref 0–0.2)
BASOPHILS NFR BLD AUTO: 0.6 % (ref 0–1.5)
BILIRUB SERPL-MCNC: 0.6 MG/DL (ref 0–1.2)
BUN SERPL-MCNC: 7 MG/DL (ref 6–20)
BUN/CREAT SERPL: 9.3 (ref 7–25)
C CAYETANENSIS DNA STL QL NAA+NON-PROBE: NOT DETECTED
C COLI+JEJ+UPSA DNA STL QL NAA+NON-PROBE: NOT DETECTED
CALCIUM SPEC-SCNC: 9.5 MG/DL (ref 8.6–10.5)
CHLORIDE SERPL-SCNC: 103 MMOL/L (ref 98–107)
CO2 SERPL-SCNC: 25 MMOL/L (ref 22–29)
CREAT SERPL-MCNC: 0.75 MG/DL (ref 0.57–1)
CRYPTOSP DNA STL QL NAA+NON-PROBE: NOT DETECTED
DEPRECATED RDW RBC AUTO: 40.6 FL (ref 37–54)
E HISTOLYT DNA STL QL NAA+NON-PROBE: NOT DETECTED
EAEC PAA PLAS AGGR+AATA ST NAA+NON-PRB: NOT DETECTED
EC STX1+STX2 GENES STL QL NAA+NON-PROBE: NOT DETECTED
EGFRCR SERPLBLD CKD-EPI 2021: 99.6 ML/MIN/1.73
EOSINOPHIL # BLD AUTO: 0.06 10*3/MM3 (ref 0–0.4)
EOSINOPHIL NFR BLD AUTO: 1.9 % (ref 0.3–6.2)
EPEC EAE GENE STL QL NAA+NON-PROBE: NOT DETECTED
ERYTHROCYTE [DISTWIDTH] IN BLOOD BY AUTOMATED COUNT: 13.6 % (ref 12.3–15.4)
ETEC LTA+ST1A+ST1B TOX ST NAA+NON-PROBE: NOT DETECTED
G LAMBLIA DNA STL QL NAA+NON-PROBE: NOT DETECTED
GLOBULIN UR ELPH-MCNC: 2.4 GM/DL
GLUCOSE SERPL-MCNC: 107 MG/DL (ref 65–99)
HCG SERPL QL: NEGATIVE
HCT VFR BLD AUTO: 31.4 % (ref 34–46.6)
HGB BLD-MCNC: 10.4 G/DL (ref 12–15.9)
IMM GRANULOCYTES # BLD AUTO: 0.01 10*3/MM3 (ref 0–0.05)
IMM GRANULOCYTES NFR BLD AUTO: 0.3 % (ref 0–0.5)
LIPASE SERPL-CCNC: 22 U/L (ref 13–60)
LYMPHOCYTES # BLD AUTO: 0.89 10*3/MM3 (ref 0.7–3.1)
LYMPHOCYTES NFR BLD AUTO: 28.9 % (ref 19.6–45.3)
MAGNESIUM SERPL-MCNC: 2 MG/DL (ref 1.6–2.6)
MCH RBC QN AUTO: 27.7 PG (ref 26.6–33)
MCHC RBC AUTO-ENTMCNC: 33.1 G/DL (ref 31.5–35.7)
MCV RBC AUTO: 83.7 FL (ref 79–97)
MONOCYTES # BLD AUTO: 0.21 10*3/MM3 (ref 0.1–0.9)
MONOCYTES NFR BLD AUTO: 6.8 % (ref 5–12)
NEUTROPHILS NFR BLD AUTO: 1.89 10*3/MM3 (ref 1.7–7)
NEUTROPHILS NFR BLD AUTO: 61.5 % (ref 42.7–76)
NOROVIRUS GI+II RNA STL QL NAA+NON-PROBE: NOT DETECTED
NRBC BLD AUTO-RTO: 0 /100 WBC (ref 0–0.2)
P SHIGELLOIDES DNA STL QL NAA+NON-PROBE: NOT DETECTED
PLATELET # BLD AUTO: 212 10*3/MM3 (ref 140–450)
PMV BLD AUTO: 10.1 FL (ref 6–12)
POTASSIUM SERPL-SCNC: 3.2 MMOL/L (ref 3.5–5.2)
PROT SERPL-MCNC: 7.1 G/DL (ref 6–8.5)
RBC # BLD AUTO: 3.75 10*6/MM3 (ref 3.77–5.28)
RVA RNA STL QL NAA+NON-PROBE: NOT DETECTED
S ENT+BONG DNA STL QL NAA+NON-PROBE: NOT DETECTED
SAPO I+II+IV+V RNA STL QL NAA+NON-PROBE: NOT DETECTED
SHIGELLA SP+EIEC IPAH ST NAA+NON-PROBE: NOT DETECTED
SODIUM SERPL-SCNC: 141 MMOL/L (ref 136–145)
V CHOL+PARA+VUL DNA STL QL NAA+NON-PROBE: NOT DETECTED
V CHOLERAE DNA STL QL NAA+NON-PROBE: NOT DETECTED
WBC NRBC COR # BLD: 3.08 10*3/MM3 (ref 3.4–10.8)
Y ENTEROCOL DNA STL QL NAA+NON-PROBE: NOT DETECTED

## 2023-08-03 PROCEDURE — 25510000001 IOPAMIDOL 61 % SOLUTION: Performed by: EMERGENCY MEDICINE

## 2023-08-03 PROCEDURE — 99285 EMERGENCY DEPT VISIT HI MDM: CPT

## 2023-08-03 PROCEDURE — 85025 COMPLETE CBC W/AUTO DIFF WBC: CPT | Performed by: PHYSICIAN ASSISTANT

## 2023-08-03 PROCEDURE — 83735 ASSAY OF MAGNESIUM: CPT | Performed by: PHYSICIAN ASSISTANT

## 2023-08-03 PROCEDURE — 80053 COMPREHEN METABOLIC PANEL: CPT | Performed by: PHYSICIAN ASSISTANT

## 2023-08-03 PROCEDURE — 84703 CHORIONIC GONADOTROPIN ASSAY: CPT | Performed by: PHYSICIAN ASSISTANT

## 2023-08-03 PROCEDURE — 96374 THER/PROPH/DIAG INJ IV PUSH: CPT

## 2023-08-03 PROCEDURE — 87507 IADNA-DNA/RNA PROBE TQ 12-25: CPT | Performed by: PHYSICIAN ASSISTANT

## 2023-08-03 PROCEDURE — 25010000002 ONDANSETRON PER 1 MG: Performed by: PHYSICIAN ASSISTANT

## 2023-08-03 PROCEDURE — 83690 ASSAY OF LIPASE: CPT | Performed by: PHYSICIAN ASSISTANT

## 2023-08-03 PROCEDURE — 74177 CT ABD & PELVIS W/CONTRAST: CPT

## 2023-08-03 RX ORDER — POTASSIUM CHLORIDE 750 MG/1
40 TABLET, FILM COATED, EXTENDED RELEASE ORAL ONCE
Status: COMPLETED | OUTPATIENT
Start: 2023-08-03 | End: 2023-08-03

## 2023-08-03 RX ORDER — DICYCLOMINE HCL 20 MG
20 TABLET ORAL EVERY 6 HOURS
Qty: 30 TABLET | Refills: 0 | Status: SHIPPED | OUTPATIENT
Start: 2023-08-03

## 2023-08-03 RX ORDER — ONDANSETRON 2 MG/ML
4 INJECTION INTRAMUSCULAR; INTRAVENOUS ONCE
Status: COMPLETED | OUTPATIENT
Start: 2023-08-03 | End: 2023-08-03

## 2023-08-03 RX ORDER — SODIUM CHLORIDE 0.9 % (FLUSH) 0.9 %
10 SYRINGE (ML) INJECTION AS NEEDED
Status: DISCONTINUED | OUTPATIENT
Start: 2023-08-03 | End: 2023-08-03 | Stop reason: HOSPADM

## 2023-08-03 RX ADMIN — ONDANSETRON 4 MG: 2 INJECTION INTRAMUSCULAR; INTRAVENOUS at 18:27

## 2023-08-03 RX ADMIN — IOPAMIDOL 85 ML: 612 INJECTION, SOLUTION INTRAVENOUS at 18:59

## 2023-08-03 RX ADMIN — POTASSIUM CHLORIDE 40 MEQ: 750 TABLET, EXTENDED RELEASE ORAL at 18:27

## 2023-08-03 NOTE — ED PROVIDER NOTES
EMERGENCY DEPARTMENT ENCOUNTER    Room Number:    Date of encounter:  8/3/2023  PCP: Rm Tidwell MD  Patient Care Team:  Rm Tidwell MD as PCP - General (Internal Medicine)   Independent Historians: Patient    HPI:  Chief Complaint: Abdominal pain, N/V/D  A complete HPI/ROS/PMH/PSH/SH/FH are unobtainable due to: None    Chronic or social conditions impacting patient care (social determinants of health): None    Context: Cleo Menon is a 48 y.o. female who presents to the ED c/o generalized abdominal pain with associated nausea and diarrhea that is been ongoing since May 2023.  She had been diagnosed with colitis at that time.  She has a follow-up appointment with GI scheduled for the end of this month but has not yet been seen by them.  No fevers or chills.  No blood in her stool.  No urinary symptoms.    Review of prior external notes (non-ED): Charge summary from 6/10/2023.  Patient was admitted viral colitis and dehydration.  No GI consultation upon admission as it was not felt to be warranted.    Review of prior external test results outside of this encounter: CT abdomen pelvis on 2023 suggested pancolitis and proctitis with secondary mesenteric adenitis.  Large left ovarian cystic lesion.    PAST MEDICAL HISTORY  Active Ambulatory Problems     Diagnosis Date Noted    Colitis 2023    Viral colitis 2023    Dehydration 2023    Nausea vomiting and diarrhea 2023    Hypokalemia 2023     Resolved Ambulatory Problems     Diagnosis Date Noted    No Resolved Ambulatory Problems     Past Medical History:   Diagnosis Date    Enlarged heart     Pulmonary hypertension     VSD (ventricular septal defect)        The patient has a COVID HM Topic on their chart, and they are fully vaccinated.    PAST SURGICAL HISTORY  Past Surgical History:   Procedure Laterality Date     SECTION      CHOLECYSTECTOMY      LUMBAR SPINAL CORD TUMOR REMOVAL      VSD REPAIR            FAMILY HISTORY  History reviewed. No pertinent family history.      SOCIAL HISTORY  Social History     Socioeconomic History    Marital status: Significant Other   Tobacco Use    Smoking status: Never    Smokeless tobacco: Never   Vaping Use    Vaping Use: Never used   Substance and Sexual Activity    Alcohol use: Never    Drug use: Never    Sexual activity: Yes     Partners: Male     Birth control/protection: I.U.D.         ALLERGIES  Patient has no known allergies.        REVIEW OF SYSTEMS  Review of Systems   Constitutional:  Negative for chills and fever.   Respiratory:  Negative for shortness of breath.    Cardiovascular:  Negative for chest pain.   Gastrointestinal:  Positive for abdominal pain, diarrhea, nausea and vomiting. Negative for blood in stool.   Genitourinary:  Negative for dysuria.      All systems reviewed and negative except for those discussed in HPI.       PHYSICAL EXAM    I have reviewed the triage vital signs and nursing notes.    ED Triage Vitals   Temp Heart Rate Resp BP SpO2   08/03/23 1702 08/03/23 1702 08/03/23 1702 08/03/23 1716 08/03/23 1702   99.3 øF (37.4 øC) 105 20 137/68 92 %      Temp src Heart Rate Source Patient Position BP Location FiO2 (%)   08/03/23 1702 -- -- -- --   Tympanic           Physical Exam  GENERAL: alert, no acute distress  SKIN: Warm, dry  HENT: Normocephalic, atraumatic  EYES: no scleral icterus  CV: regular rhythm, regular rate  RESPIRATORY: normal effort, lungs clear  ABDOMEN: soft, generalized abdominal discomfort without focal tenderness or guarding, nondistended  MUSCULOSKELETAL: no deformity  NEURO: alert, moves all extremities, follows commands          LAB RESULTS  Recent Results (from the past 24 hour(s))   Comprehensive Metabolic Panel    Collection Time: 08/03/23  5:44 PM    Specimen: Blood   Result Value Ref Range    Glucose 107 (H) 65 - 99 mg/dL    BUN 7 6 - 20 mg/dL    Creatinine 0.75 0.57 - 1.00 mg/dL    Sodium 141 136 - 145 mmol/L     Potassium 3.2 (L) 3.5 - 5.2 mmol/L    Chloride 103 98 - 107 mmol/L    CO2 25.0 22.0 - 29.0 mmol/L    Calcium 9.5 8.6 - 10.5 mg/dL    Total Protein 7.1 6.0 - 8.5 g/dL    Albumin 4.7 3.5 - 5.2 g/dL    ALT (SGPT) 17 1 - 33 U/L    AST (SGOT) 19 1 - 32 U/L    Alkaline Phosphatase 68 39 - 117 U/L    Total Bilirubin 0.6 0.0 - 1.2 mg/dL    Globulin 2.4 gm/dL    A/G Ratio 2.0 g/dL    BUN/Creatinine Ratio 9.3 7.0 - 25.0    Anion Gap 13.0 5.0 - 15.0 mmol/L    eGFR 99.6 >60.0 mL/min/1.73   Lipase    Collection Time: 08/03/23  5:44 PM    Specimen: Blood   Result Value Ref Range    Lipase 22 13 - 60 U/L   hCG, Serum, Qualitative    Collection Time: 08/03/23  5:44 PM    Specimen: Blood   Result Value Ref Range    HCG Qualitative Negative Negative   Magnesium    Collection Time: 08/03/23  5:44 PM    Specimen: Blood   Result Value Ref Range    Magnesium 2.0 1.6 - 2.6 mg/dL   CBC Auto Differential    Collection Time: 08/03/23  5:44 PM    Specimen: Blood   Result Value Ref Range    WBC 3.08 (L) 3.40 - 10.80 10*3/mm3    RBC 3.75 (L) 3.77 - 5.28 10*6/mm3    Hemoglobin 10.4 (L) 12.0 - 15.9 g/dL    Hematocrit 31.4 (L) 34.0 - 46.6 %    MCV 83.7 79.0 - 97.0 fL    MCH 27.7 26.6 - 33.0 pg    MCHC 33.1 31.5 - 35.7 g/dL    RDW 13.6 12.3 - 15.4 %    RDW-SD 40.6 37.0 - 54.0 fl    MPV 10.1 6.0 - 12.0 fL    Platelets 212 140 - 450 10*3/mm3    Neutrophil % 61.5 42.7 - 76.0 %    Lymphocyte % 28.9 19.6 - 45.3 %    Monocyte % 6.8 5.0 - 12.0 %    Eosinophil % 1.9 0.3 - 6.2 %    Basophil % 0.6 0.0 - 1.5 %    Immature Grans % 0.3 0.0 - 0.5 %    Neutrophils, Absolute 1.89 1.70 - 7.00 10*3/mm3    Lymphocytes, Absolute 0.89 0.70 - 3.10 10*3/mm3    Monocytes, Absolute 0.21 0.10 - 0.90 10*3/mm3    Eosinophils, Absolute 0.06 0.00 - 0.40 10*3/mm3    Basophils, Absolute 0.02 0.00 - 0.20 10*3/mm3    Immature Grans, Absolute 0.01 0.00 - 0.05 10*3/mm3    nRBC 0.0 0.0 - 0.2 /100 WBC       Ordered the above labs and independently reviewed and interpreted the  results.        RADIOLOGY  CT Abdomen Pelvis With Contrast    Result Date: 8/3/2023  Abdomen and pelvis CT with contrast  HISTORY: Generalized abdominal pain with nausea vomiting and diarrhea.  TECHNIQUE: Abdomen and pelvis CT was performed using 85 mL of Isovue-300 IV contrast correlated CT June 4, 2023.  Radiation dose reduction techniques were utilized, including automated exposure control and exposure modulation based on body size.  FINDINGS: Cardiac chambers are dilated. Visualized lung bases are clear. There is been previous cholecystectomy. Parenchyma the liver, spleen, adrenals, and pancreas appears normal. There are several cysts at the right kidney. No hydronephrosis or calculus.  The stomach, small bowel, and large bowel appear normal except for a few diverticula along the sigmoid colon. No evidence of colitis. Urinary bladder appears normal. There is a radiopaque contraceptive device within the endometrial cavity. Enhancing nodular 20 mm lesion along the uterine fundus consistent with fibroid. Right ovary appears normally. Left ovary appears normal. Dilated, tubular, cystic structure in the left pelvis is favored represent hydrosalpinx measuring about 9 x 4.6 cm axial and 5 cm thick as craniocaudad dimension, unchanged. No inflammatory change around this lesion or elsewhere in the abdomen or pelvis.  No lymphadenopathy. Normal caliber abdominal aorta. Degenerative change in the lower lumbar spine.      No acute abnormality is present. Tubular cystic lesion in the lateral left pelvis adjacent to the uterus is favored to be hydrosalpinx and appears similar as on the CT from a few months ago.  This report was finalized on 8/3/2023 7:59 PM by Dr. Paramjit Ugarte M.D.       I ordered the above noted radiological studies. Independently reviewed and interpreted by me.  See dictation for official radiology interpretation.      PROCEDURES    Procedures      MEDICATIONS GIVEN IN ER    Medications   sodium chloride  0.9 % flush 10 mL (has no administration in time range)   ondansetron (ZOFRAN) injection 4 mg (4 mg Intravenous Given 8/3/23 1827)   potassium chloride (K-DUR,KLOR-CON) ER tablet 40 mEq (40 mEq Oral Given 8/3/23 1827)   iopamidol (ISOVUE-300) 61 % injection 100 mL (85 mL Intravenous Given 8/3/23 1859)         PROGRESS, DATA ANALYSIS, CONSULTS, AND MEDICAL DECISION MAKING    All labs have been independently reviewed and interpreted by me.  All radiology studies have been independently reviewed and interpreted by me and discussed with radiologist dictating the report.   EKG's independently reviewed and interpreted by me.  Discussion below represents my analysis of pertinent findings related to patient's condition, differential diagnosis, treatment plan and final disposition.    My differential diagnosis for abdominal pain includes but is not limited to:    Gastritis, gastroenteritis, peptic ulcer disease, GERD, esophageal perforation, acute appendicitis, mesenteric adenitis, Meckel's diverticulum, epiploic appendagitis, diverticulitis, colon cancer, ulcerative colitis, Crohn's disease, intussusception, small bowel obstruction, adhesions, ischemic bowel, perforated viscus, ileus, obstipation, biliary colic, cholecystitis, cholelithiasis, Rudi-Diego Saul, hepatitis, pancreatitis, common bile duct obstruction, cholangitis, bile leak, splenic trauma, splenic rupture, splenic infarction, splenic abscess, abdominal abscess, ascites, spontaneous bacterial peritonitis, hernia, UTI, cystitis, ureterolithiasis, urinary obstruction, ovarian cyst, torsion, pregnancy, ectopic pregnancy, PID, pelvic abscess, mittelschmerz, endometriosis, AAA, myocardial infarction, pneumonia, cancer, porphyria, DKA, medications, sickle cell, viral syndrome, herpes zoster      ED Course as of 08/03/23 2200   Thu Aug 03, 2023   1800 WBC(!): 3.08 [DC]   1800 Hemoglobin(!): 10.4  No change in past month [DC]   1812 Lipase: 22 [DC]   1819 Creatinine:  0.75 [DC]   1819 Sodium: 141 [DC]   1819 Potassium(!): 3.2  Given PO [DC]   1819 Magnesium: 2.0 [DC]   1846 HCG Qualitative: Negative [DC]   2000 CT Abdomen Pelvis With Contrast  Radiology study independently interpreted by me and my findings are no free air.   [DC]   2040 Discussed lab and imaging results.  We will trial patient on Bentyl.  She was able to provide a stool sample and will send this for culture.  She has follow-up with GI scheduled for later this month and encouraged to keep this appointment.  ER return precautions discussed. [DC]      ED Course User Index  [DC] Kena Dumont PA           PPE: Patient was placed in face mask in first look. Patient was wearing facemask when I entered the room and throughout our encounter. I wore full protective equipment throughout this patient encounter including a face mask, and gloves. Hand hygiene was performed before donning protective equipment and after removal when leaving the room.        AS OF 22:00 EDT VITALS:    BP - 145/72  HR - 79  TEMP - 99.3 øF (37.4 øC) (Tympanic)  O2 SATS - 100%        DIAGNOSIS  Final diagnoses:   Generalized abdominal pain   Diarrhea, unspecified type         DISPOSITION  ED Disposition       ED Disposition   Discharge    Condition   Stable    Comment   --                  Note Disclaimer: At [facility], we believe that sharing information builds trust and better relationships. You are receiving this note because you recently visited [facility]. It is possible you will see health information before a provider has talked with you about it. This kind of information can be easy to misunderstand. To help you fully understand what it means for your health, we urge you to discuss this note with your provider.         Kena Dumont PA  08/03/23 2200

## 2023-08-03 NOTE — ED NOTES
Patient with hx of viral colitis in May presents with intermittent n/v/d, decreased appetite since initial dx. Patient states that she has started taking digestive enzymes, pepto-bismul, and zofran and has alleviates symptoms some. States that she has been unable to get an appointment with a gastroenterologist.

## 2023-08-21 ENCOUNTER — OFFICE VISIT (OUTPATIENT)
Dept: GASTROENTEROLOGY | Facility: CLINIC | Age: 48
End: 2023-08-21
Payer: MEDICARE

## 2023-08-21 ENCOUNTER — TELEPHONE (OUTPATIENT)
Dept: GASTROENTEROLOGY | Facility: CLINIC | Age: 48
End: 2023-08-21
Payer: MEDICARE

## 2023-08-21 VITALS
WEIGHT: 188.4 LBS | HEIGHT: 63 IN | DIASTOLIC BLOOD PRESSURE: 69 MMHG | TEMPERATURE: 96.6 F | OXYGEN SATURATION: 90 % | HEART RATE: 87 BPM | BODY MASS INDEX: 33.38 KG/M2 | SYSTOLIC BLOOD PRESSURE: 107 MMHG

## 2023-08-21 DIAGNOSIS — R10.30 LOWER ABDOMINAL PAIN: ICD-10-CM

## 2023-08-21 DIAGNOSIS — R19.7 DIARRHEA, UNSPECIFIED TYPE: Primary | ICD-10-CM

## 2023-08-21 DIAGNOSIS — R93.3 ABNORMAL FINDING ON GI TRACT IMAGING: ICD-10-CM

## 2023-08-21 DIAGNOSIS — R63.4 WEIGHT LOSS: ICD-10-CM

## 2023-08-21 DIAGNOSIS — D50.8 OTHER IRON DEFICIENCY ANEMIA: ICD-10-CM

## 2023-08-21 DIAGNOSIS — R11.2 NAUSEA AND VOMITING, UNSPECIFIED VOMITING TYPE: ICD-10-CM

## 2023-08-21 DIAGNOSIS — K21.9 GASTROESOPHAGEAL REFLUX DISEASE, UNSPECIFIED WHETHER ESOPHAGITIS PRESENT: ICD-10-CM

## 2023-08-21 PROBLEM — D64.9 ABSOLUTE ANEMIA: Status: ACTIVE | Noted: 2023-08-21

## 2023-08-21 LAB
BASOPHILS # BLD AUTO: 0.03 10*3/MM3 (ref 0–0.2)
BASOPHILS NFR BLD AUTO: 1 % (ref 0–1.5)
CRP SERPL-MCNC: <0.3 MG/DL (ref 0–0.5)
EOSINOPHIL # BLD AUTO: 0.08 10*3/MM3 (ref 0–0.4)
EOSINOPHIL NFR BLD AUTO: 2.7 % (ref 0.3–6.2)
ERYTHROCYTE [DISTWIDTH] IN BLOOD BY AUTOMATED COUNT: 13.6 % (ref 12.3–15.4)
ERYTHROCYTE [SEDIMENTATION RATE] IN BLOOD BY WESTERGREN METHOD: 13 MM/HR (ref 0–20)
FERRITIN SERPL-MCNC: 13.2 NG/ML (ref 13–150)
FOLATE SERPL-MCNC: >20 NG/ML (ref 4.78–24.2)
HCT VFR BLD AUTO: 33.6 % (ref 34–46.6)
HGB BLD-MCNC: 10.8 G/DL (ref 12–15.9)
IMM GRANULOCYTES # BLD AUTO: 0.01 10*3/MM3 (ref 0–0.05)
IMM GRANULOCYTES NFR BLD AUTO: 0.3 % (ref 0–0.5)
IRON SATN MFR SERPL: 13 % (ref 20–50)
IRON SERPL-MCNC: 58 MCG/DL (ref 37–145)
LYMPHOCYTES # BLD AUTO: 0.89 10*3/MM3 (ref 0.7–3.1)
LYMPHOCYTES NFR BLD AUTO: 29.9 % (ref 19.6–45.3)
MCH RBC QN AUTO: 26.6 PG (ref 26.6–33)
MCHC RBC AUTO-ENTMCNC: 32.1 G/DL (ref 31.5–35.7)
MCV RBC AUTO: 82.8 FL (ref 79–97)
MONOCYTES # BLD AUTO: 0.24 10*3/MM3 (ref 0.1–0.9)
MONOCYTES NFR BLD AUTO: 8.1 % (ref 5–12)
NEUTROPHILS # BLD AUTO: 1.73 10*3/MM3 (ref 1.7–7)
NEUTROPHILS NFR BLD AUTO: 58 % (ref 42.7–76)
NRBC BLD AUTO-RTO: 0 /100 WBC (ref 0–0.2)
PLATELET # BLD AUTO: 174 10*3/MM3 (ref 140–450)
RBC # BLD AUTO: 4.06 10*6/MM3 (ref 3.77–5.28)
TIBC SERPL-MCNC: 457 MCG/DL
TSH SERPL DL<=0.005 MIU/L-ACNC: 3.04 UIU/ML (ref 0.27–4.2)
UIBC SERPL-MCNC: 399 MCG/DL (ref 112–346)
VIT B12 SERPL-MCNC: 310 PG/ML (ref 211–946)
WBC # BLD AUTO: 2.98 10*3/MM3 (ref 3.4–10.8)

## 2023-08-21 PROCEDURE — 99214 OFFICE O/P EST MOD 30 MIN: CPT | Performed by: NURSE PRACTITIONER

## 2023-08-21 PROCEDURE — 1160F RVW MEDS BY RX/DR IN RCRD: CPT | Performed by: NURSE PRACTITIONER

## 2023-08-21 PROCEDURE — 1159F MED LIST DOCD IN RCRD: CPT | Performed by: NURSE PRACTITIONER

## 2023-08-21 RX ORDER — ESOMEPRAZOLE MAGNESIUM 40 MG/1
40 CAPSULE, DELAYED RELEASE ORAL 2 TIMES DAILY
Qty: 180 CAPSULE | Refills: 3 | Status: SHIPPED | OUTPATIENT
Start: 2023-08-21

## 2023-08-21 RX ORDER — FLUCONAZOLE 150 MG/1
TABLET ORAL
COMMUNITY
Start: 2023-06-14 | End: 2023-08-21

## 2023-08-21 RX ORDER — POTASSIUM CHLORIDE 20 MEQ/1
20 TABLET, EXTENDED RELEASE ORAL DAILY
COMMUNITY
Start: 2023-06-16

## 2023-08-21 RX ORDER — FLUTICASONE PROPIONATE 50 MCG
SPRAY, SUSPENSION (ML) NASAL
COMMUNITY
Start: 2023-02-08

## 2023-08-21 NOTE — TELEPHONE ENCOUNTER
Pa request from [email or web address] for hyoscamine. On paper pa request plan phone # is [phone]. Patient ID # [id number]    Routed to karishma

## 2023-08-21 NOTE — H&P (VIEW-ONLY)
Chief Complaint   Patient presents with    Abdominal Pain       HPI    Cleo Menon is a  48 y.o. female here to establish care as a new patient for complaints of abdominal pain.    Past medical history of generalized anxiety disorder, lap wai, pulmonary hypertension and ventricular septal defect repaired in 1981.    This patient will also follow with Dr. Jordan.    This patient has had multiple ER evaluations and hospital admission in June (reviewed) admitted for colitis and dehydration.  She was diagnosed with viral gastroenteritis based on symptomology and leukocytopenia.  She was treated with supportive care and gut rest.  She was kept on broad-spectrum Zosyn however without post discharge antibiotics. CT A/P with contrast during admission w/ evidence of colitis, uterine fibroid, dilated tubular cystic structure in the left pelvis unchanged from prior imaging.     She then presented to the emergency room earlier this month complaining of abdominal pain, nausea and vomiting.  Repeat CT of the abdomen pelvis with contrast showed again tubular cystic lesion in the left pelvis adjacent to the uterus otherwise unremarkable.  She was given trial of Bentyl and told to keep consultation with our services.  GI PCR was negative.  Labs noted to have hemoglobin of 10.4 with a white blood cell count of 3.08.  Normal liver function test.    Patient reports onset of lower abdominal pain, diarrhea and nausea and vomiting in May no relief with hospitalization or recent ER evaluation.  Some reduction in vomiting but continues w/ daily nausea.  She has chronic GERD for which she takes Protonix 40 mg once daily.  She has been on this particular PPI for a number of years.  Reports poor appetite with 20 pound weight loss.  No dysphagia or odynophagia.  Reports minimal improvement in symptoms with as needed Bentyl.    She is having up to 10 liquid stools a day on average.  No rectal bleeding or rectal pain.  She is currently  taking digestive enzymes.  She has not tried probiotics.  She denies sick contacts or antibiotic use.    No family history of colon cancer or inflammatory bowel disease.    Patient following with [facility] for tubular cystic lesion.    Past Medical History:   Diagnosis Date    Enlarged heart     Pulmonary hypertension     VSD (ventricular septal defect)     repair done        Past Surgical History:   Procedure Laterality Date     SECTION      CHOLECYSTECTOMY      LUMBAR SPINAL CORD TUMOR REMOVAL      VSD REPAIR         Scheduled Meds:     Continuous Infusions: No current facility-administered medications for this visit.      PRN Meds:     Allergies   Allergen Reactions    Adhesive Tape Rash    Amoxicillin Hives and Itching    Wound Dressing Adhesive Rash    Latex Rash and Unknown (See Comments)       Social History     Socioeconomic History    Marital status: Significant Other   Tobacco Use    Smoking status: Never    Smokeless tobacco: Never   Vaping Use    Vaping Use: Never used   Substance and Sexual Activity    Alcohol use: Never    Drug use: Never    Sexual activity: Yes     Partners: Male     Birth control/protection: I.U.D.       History reviewed. No pertinent family history.    Review of Systems   Constitutional:  Positive for unexpected weight change. Negative for appetite change.   HENT:  Negative for trouble swallowing.    Gastrointestinal:  Positive for abdominal pain, diarrhea, nausea and vomiting.     Vitals:    23 1012   BP: 107/69   Pulse: 87   Temp: 96.6 øF (35.9 øC)   SpO2: 90%       Physical Exam  Constitutional:       Appearance: She is well-developed.   Abdominal:      General: Bowel sounds are normal. There is no distension.      Palpations: Abdomen is soft. There is no mass.      Tenderness: There is no abdominal tenderness. There is no guarding.      Hernia: No hernia is present.   Skin:     General: Skin is warm and dry.      Capillary Refill: Capillary refill takes less  than 2 seconds.   Neurological:      Mental Status: She is alert and oriented to person, place, and time.   Psychiatric:         Behavior: Behavior normal.     Assessment    Diagnoses and all orders for this visit:    1. Diarrhea, unspecified type (Primary)  -     Case Request; Standing  -     Obtain Informed Consent; Standing  -     Verify Bowel Prep Was Successful; Standing  -     Give Tap Water Enema If Bowel Prep Insufficient; Standing  -     Case Request  -     CBC & Differential  -     Iron and TIBC  -     Vitamin B12 and Folate  -     Ferritin  -     Celiac Comprehensive Panel  -     TSH  -     C-reactive Protein  -     Sedimentation Rate  -     Clostridioides difficile EIA - Stool, Per Rectum  -     Calprotectin, Fecal - Stool, Per Rectum    2. Lower abdominal pain  -     Case Request; Standing  -     Obtain Informed Consent; Standing  -     Verify Bowel Prep Was Successful; Standing  -     Give Tap Water Enema If Bowel Prep Insufficient; Standing  -     Case Request  -     CBC & Differential  -     Iron and TIBC  -     Vitamin B12 and Folate  -     Ferritin  -     Celiac Comprehensive Panel  -     TSH  -     C-reactive Protein  -     Sedimentation Rate    3. Other iron deficiency anemia  -     Case Request; Standing  -     Obtain Informed Consent; Standing  -     Verify Bowel Prep Was Successful; Standing  -     Give Tap Water Enema If Bowel Prep Insufficient; Standing  -     Case Request  -     CBC & Differential  -     Iron and TIBC  -     Vitamin B12 and Folate  -     Ferritin    4. Nausea and vomiting, unspecified vomiting type  -     Case Request; Standing  -     Obtain Informed Consent; Standing  -     Verify Bowel Prep Was Successful; Standing  -     Give Tap Water Enema If Bowel Prep Insufficient; Standing  -     Case Request  -     CBC & Differential  -     Iron and TIBC  -     Vitamin B12 and Folate  -     Ferritin  -     Celiac Comprehensive Panel  -     TSH  -     C-reactive Protein  -      Sedimentation Rate    5. Gastroesophageal reflux disease, unspecified whether esophagitis present  -     Case Request; Standing  -     Obtain Informed Consent; Standing  -     Verify Bowel Prep Was Successful; Standing  -     Give Tap Water Enema If Bowel Prep Insufficient; Standing  -     Case Request    6. Weight loss  -     Case Request; Standing  -     Obtain Informed Consent; Standing  -     Verify Bowel Prep Was Successful; Standing  -     Give Tap Water Enema If Bowel Prep Insufficient; Standing  -     Case Request  -     CBC & Differential  -     Iron and TIBC  -     Vitamin B12 and Folate  -     Ferritin  -     Celiac Comprehensive Panel  -     TSH  -     C-reactive Protein  -     Sedimentation Rate    7. Abnormal finding on GI tract imaging  -     Case Request; Standing  -     Obtain Informed Consent; Standing  -     Verify Bowel Prep Was Successful; Standing  -     Give Tap Water Enema If Bowel Prep Insufficient; Standing  -     Case Request    Other orders  -     hyoscyamine (LEVSIN) 0.125 MG SL tablet; Take 1 tablet by mouth 3 (Three) Times a Day As Needed for Cramping or Diarrhea.  Dispense: 120 tablet; Refill: 5  -     esomeprazole (nexIUM) 40 MG capsule; Take 1 capsule by mouth 2 (Two) Times a Day.  Dispense: 180 capsule; Refill: 3       Plan    Recommend EGD and colonoscopy with Dr. Jordan  Labs today as above  Stop Bentyl and give trial of Levsin  Stop Protonix  Start Nexium 40 mg p.o. twice daily  Further recommendations and follow-up pending the aforementioned work-up         NORA Marques  [facility]  [address] [place] [address]  Office: [phone]

## 2023-08-21 NOTE — PROGRESS NOTES
Chief Complaint   Patient presents with    Abdominal Pain       HPI    Cleo Menon is a  48 y.o. female here to establish care as a new patient for complaints of abdominal pain.    Past medical history of generalized anxiety disorder, lap wai, pulmonary hypertension and ventricular septal defect repaired in 1981.    This patient will also follow with Dr. Jordan.    This patient has had multiple ER evaluations and hospital admission in June (reviewed) admitted for colitis and dehydration.  She was diagnosed with viral gastroenteritis based on symptomology and leukocytopenia.  She was treated with supportive care and gut rest.  She was kept on broad-spectrum Zosyn however without post discharge antibiotics. CT A/P with contrast during admission w/ evidence of colitis, uterine fibroid, dilated tubular cystic structure in the left pelvis unchanged from prior imaging.     She then presented to the emergency room earlier this month complaining of abdominal pain, nausea and vomiting.  Repeat CT of the abdomen pelvis with contrast showed again tubular cystic lesion in the left pelvis adjacent to the uterus otherwise unremarkable.  She was given trial of Bentyl and told to keep consultation with our services.  GI PCR was negative.  Labs noted to have hemoglobin of 10.4 with a white blood cell count of 3.08.  Normal liver function test.    Patient reports onset of lower abdominal pain, diarrhea and nausea and vomiting in May no relief with hospitalization or recent ER evaluation.  Some reduction in vomiting but continues w/ daily nausea.  She has chronic GERD for which she takes Protonix 40 mg once daily.  She has been on this particular PPI for a number of years.  Reports poor appetite with 20 pound weight loss.  No dysphagia or odynophagia.  Reports minimal improvement in symptoms with as needed Bentyl.    She is having up to 10 liquid stools a day on average.  No rectal bleeding or rectal pain.  She is currently  taking digestive enzymes.  She has not tried probiotics.  She denies sick contacts or antibiotic use.    No family history of colon cancer or inflammatory bowel disease.    Patient following with [facility] for tubular cystic lesion.    Past Medical History:   Diagnosis Date    Enlarged heart     Pulmonary hypertension     VSD (ventricular septal defect)     repair done        Past Surgical History:   Procedure Laterality Date     SECTION      CHOLECYSTECTOMY      LUMBAR SPINAL CORD TUMOR REMOVAL      VSD REPAIR         Scheduled Meds:     Continuous Infusions: No current facility-administered medications for this visit.      PRN Meds:     Allergies   Allergen Reactions    Adhesive Tape Rash    Amoxicillin Hives and Itching    Wound Dressing Adhesive Rash    Latex Rash and Unknown (See Comments)       Social History     Socioeconomic History    Marital status: Significant Other   Tobacco Use    Smoking status: Never    Smokeless tobacco: Never   Vaping Use    Vaping Use: Never used   Substance and Sexual Activity    Alcohol use: Never    Drug use: Never    Sexual activity: Yes     Partners: Male     Birth control/protection: I.U.D.       History reviewed. No pertinent family history.    Review of Systems   Constitutional:  Positive for unexpected weight change. Negative for appetite change.   HENT:  Negative for trouble swallowing.    Gastrointestinal:  Positive for abdominal pain, diarrhea, nausea and vomiting.     Vitals:    23 1012   BP: 107/69   Pulse: 87   Temp: 96.6 øF (35.9 øC)   SpO2: 90%       Physical Exam  Constitutional:       Appearance: She is well-developed.   Abdominal:      General: Bowel sounds are normal. There is no distension.      Palpations: Abdomen is soft. There is no mass.      Tenderness: There is no abdominal tenderness. There is no guarding.      Hernia: No hernia is present.   Skin:     General: Skin is warm and dry.      Capillary Refill: Capillary refill takes less  than 2 seconds.   Neurological:      Mental Status: She is alert and oriented to person, place, and time.   Psychiatric:         Behavior: Behavior normal.     Assessment    Diagnoses and all orders for this visit:    1. Diarrhea, unspecified type (Primary)  -     Case Request; Standing  -     Obtain Informed Consent; Standing  -     Verify Bowel Prep Was Successful; Standing  -     Give Tap Water Enema If Bowel Prep Insufficient; Standing  -     Case Request  -     CBC & Differential  -     Iron and TIBC  -     Vitamin B12 and Folate  -     Ferritin  -     Celiac Comprehensive Panel  -     TSH  -     C-reactive Protein  -     Sedimentation Rate  -     Clostridioides difficile EIA - Stool, Per Rectum  -     Calprotectin, Fecal - Stool, Per Rectum    2. Lower abdominal pain  -     Case Request; Standing  -     Obtain Informed Consent; Standing  -     Verify Bowel Prep Was Successful; Standing  -     Give Tap Water Enema If Bowel Prep Insufficient; Standing  -     Case Request  -     CBC & Differential  -     Iron and TIBC  -     Vitamin B12 and Folate  -     Ferritin  -     Celiac Comprehensive Panel  -     TSH  -     C-reactive Protein  -     Sedimentation Rate    3. Other iron deficiency anemia  -     Case Request; Standing  -     Obtain Informed Consent; Standing  -     Verify Bowel Prep Was Successful; Standing  -     Give Tap Water Enema If Bowel Prep Insufficient; Standing  -     Case Request  -     CBC & Differential  -     Iron and TIBC  -     Vitamin B12 and Folate  -     Ferritin    4. Nausea and vomiting, unspecified vomiting type  -     Case Request; Standing  -     Obtain Informed Consent; Standing  -     Verify Bowel Prep Was Successful; Standing  -     Give Tap Water Enema If Bowel Prep Insufficient; Standing  -     Case Request  -     CBC & Differential  -     Iron and TIBC  -     Vitamin B12 and Folate  -     Ferritin  -     Celiac Comprehensive Panel  -     TSH  -     C-reactive Protein  -      Sedimentation Rate    5. Gastroesophageal reflux disease, unspecified whether esophagitis present  -     Case Request; Standing  -     Obtain Informed Consent; Standing  -     Verify Bowel Prep Was Successful; Standing  -     Give Tap Water Enema If Bowel Prep Insufficient; Standing  -     Case Request    6. Weight loss  -     Case Request; Standing  -     Obtain Informed Consent; Standing  -     Verify Bowel Prep Was Successful; Standing  -     Give Tap Water Enema If Bowel Prep Insufficient; Standing  -     Case Request  -     CBC & Differential  -     Iron and TIBC  -     Vitamin B12 and Folate  -     Ferritin  -     Celiac Comprehensive Panel  -     TSH  -     C-reactive Protein  -     Sedimentation Rate    7. Abnormal finding on GI tract imaging  -     Case Request; Standing  -     Obtain Informed Consent; Standing  -     Verify Bowel Prep Was Successful; Standing  -     Give Tap Water Enema If Bowel Prep Insufficient; Standing  -     Case Request    Other orders  -     hyoscyamine (LEVSIN) 0.125 MG SL tablet; Take 1 tablet by mouth 3 (Three) Times a Day As Needed for Cramping or Diarrhea.  Dispense: 120 tablet; Refill: 5  -     esomeprazole (nexIUM) 40 MG capsule; Take 1 capsule by mouth 2 (Two) Times a Day.  Dispense: 180 capsule; Refill: 3       Plan    Recommend EGD and colonoscopy with Dr. Jordan  Labs today as above  Stop Bentyl and give trial of Levsin  Stop Protonix  Start Nexium 40 mg p.o. twice daily  Further recommendations and follow-up pending the aforementioned work-up         NORA Marques  [facility]  [address] [place] [address]  Office: [phone]

## 2023-08-22 ENCOUNTER — TELEPHONE (OUTPATIENT)
Dept: GASTROENTEROLOGY | Facility: CLINIC | Age: 48
End: 2023-08-22
Payer: MEDICARE

## 2023-08-22 LAB
ENDOMYSIUM IGA SER QL: NEGATIVE
GLIADIN PEPTIDE IGA SER-ACNC: 4 UNITS (ref 0–19)
GLIADIN PEPTIDE IGG SER-ACNC: 3 UNITS (ref 0–19)
IGA SERPL-MCNC: 193 MG/DL (ref 87–352)
TTG IGA SER-ACNC: <2 U/ML (ref 0–3)
TTG IGG SER-ACNC: 9 U/ML (ref 0–5)

## 2023-08-22 NOTE — PROGRESS NOTES
Please inform the patient lab work shows continued anemia but stable at 10.8.  Normal B12 and folate.  Normal thyroid function.  Await endoscopic exam.

## 2023-08-23 ENCOUNTER — TELEPHONE (OUTPATIENT)
Dept: GASTROENTEROLOGY | Facility: CLINIC | Age: 48
End: 2023-08-23
Payer: MEDICARE

## 2023-08-23 NOTE — TELEPHONE ENCOUNTER
----- Message from NORA Marques sent at 8/22/2023  3:19 PM EDT -----  Please inform the patient lab work shows continued anemia but stable at 10.8.  Normal B12 and folate.  Normal thyroid function.  Await endoscopic exam.

## 2023-08-24 ENCOUNTER — PATIENT ROUNDING (BHMG ONLY) (OUTPATIENT)
Dept: GASTROENTEROLOGY | Facility: CLINIC | Age: 48
End: 2023-08-24
Payer: MEDICARE

## 2023-08-24 NOTE — PROGRESS NOTES
August 24, 2023    Hello, may I speak with Cleo Menon?    My name is Noris      I am  with [facility]  [address] [place] [id number] [phone].    Before we get started may I verify your date of birth? 1975    I am calling to officially welcome you to our practice and ask about your recent visit. Is this a good time to talk? yes    Tell me about your visit with us. What things went well?  It was wonderful from the time I walking until the time I left. Everyone was great at the office visit. I am glad that you all are going to be able to do my scope on Monday so soon.        We're always looking for ways to make our patients' experiences even better. Do you have recommendations on ways we may improve?  No, not for your office. I went to the hospital and was admitted for 3 day with gastro issues but no one from gastro ever came and seen me while I was admitted. I had to wait all summer and had to deal with my symptoms daily. I called around to see who could see me sooner and your office was the one that could get me in for an appointment sooner. The provider I seen at your office also was also surprised that the hospital did not call for a gastro doctor to come and see me while admitted.     Overall were you satisfied with your first visit to our practice? yes       I appreciate you taking the time to speak with me today. Is there anything else I can do for you? no      Thank you, and have a great day.

## 2023-08-28 ENCOUNTER — ANESTHESIA (OUTPATIENT)
Dept: GASTROENTEROLOGY | Facility: HOSPITAL | Age: 48
End: 2023-08-28
Payer: MEDICARE

## 2023-08-28 ENCOUNTER — TELEPHONE (OUTPATIENT)
Dept: GASTROENTEROLOGY | Facility: CLINIC | Age: 48
End: 2023-08-28
Payer: MEDICARE

## 2023-08-28 ENCOUNTER — HOSPITAL ENCOUNTER (OUTPATIENT)
Facility: HOSPITAL | Age: 48
Setting detail: HOSPITAL OUTPATIENT SURGERY
Discharge: HOME | End: 2023-08-28
Attending: INTERNAL MEDICINE | Admitting: INTERNAL MEDICINE
Payer: MEDICARE

## 2023-08-28 ENCOUNTER — ANESTHESIA EVENT (OUTPATIENT)
Dept: GASTROENTEROLOGY | Facility: HOSPITAL | Age: 48
End: 2023-08-28
Payer: MEDICARE

## 2023-08-28 VITALS
BODY MASS INDEX: 33.17 KG/M2 | RESPIRATION RATE: 21 BRPM | SYSTOLIC BLOOD PRESSURE: 124 MMHG | DIASTOLIC BLOOD PRESSURE: 53 MMHG | HEART RATE: 98 BPM | WEIGHT: 187.2 LBS | OXYGEN SATURATION: 97 % | HEIGHT: 63 IN

## 2023-08-28 DIAGNOSIS — R19.7 DIARRHEA, UNSPECIFIED TYPE: ICD-10-CM

## 2023-08-28 DIAGNOSIS — R63.4 WEIGHT LOSS: ICD-10-CM

## 2023-08-28 DIAGNOSIS — R11.2 NAUSEA AND VOMITING, UNSPECIFIED VOMITING TYPE: ICD-10-CM

## 2023-08-28 DIAGNOSIS — K21.9 GASTROESOPHAGEAL REFLUX DISEASE, UNSPECIFIED WHETHER ESOPHAGITIS PRESENT: ICD-10-CM

## 2023-08-28 DIAGNOSIS — D50.8 OTHER IRON DEFICIENCY ANEMIA: ICD-10-CM

## 2023-08-28 DIAGNOSIS — R10.30 LOWER ABDOMINAL PAIN: ICD-10-CM

## 2023-08-28 LAB
B-HCG UR QL: NEGATIVE
EXPIRATION DATE: NORMAL
INTERNAL NEGATIVE CONTROL: NEGATIVE
INTERNAL POSITIVE CONTROL: POSITIVE
Lab: NORMAL

## 2023-08-28 PROCEDURE — 81025 URINE PREGNANCY TEST: CPT | Performed by: INTERNAL MEDICINE

## 2023-08-28 PROCEDURE — 25010000002 PROPOFOL 10 MG/ML EMULSION: Performed by: ANESTHESIOLOGY

## 2023-08-28 PROCEDURE — 88305 TISSUE EXAM BY PATHOLOGIST: CPT | Performed by: INTERNAL MEDICINE

## 2023-08-28 RX ORDER — PROPOFOL 10 MG/ML
VIAL (ML) INTRAVENOUS CONTINUOUS PRN
Status: DISCONTINUED | OUTPATIENT
Start: 2023-08-28 | End: 2023-08-28 | Stop reason: SURG

## 2023-08-28 RX ORDER — SODIUM CHLORIDE, SODIUM LACTATE, POTASSIUM CHLORIDE, CALCIUM CHLORIDE 600; 310; 30; 20 MG/100ML; MG/100ML; MG/100ML; MG/100ML
1000 INJECTION, SOLUTION INTRAVENOUS CONTINUOUS
Status: DISCONTINUED | OUTPATIENT
Start: 2023-08-28 | End: 2023-08-28 | Stop reason: HOSPADM

## 2023-08-28 RX ORDER — LIDOCAINE HYDROCHLORIDE 20 MG/ML
INJECTION, SOLUTION INFILTRATION; PERINEURAL AS NEEDED
Status: DISCONTINUED | OUTPATIENT
Start: 2023-08-28 | End: 2023-08-28 | Stop reason: SURG

## 2023-08-28 RX ORDER — SODIUM CHLORIDE 0.9 % (FLUSH) 0.9 %
10 SYRINGE (ML) INJECTION AS NEEDED
Status: DISCONTINUED | OUTPATIENT
Start: 2023-08-28 | End: 2023-08-28 | Stop reason: HOSPADM

## 2023-08-28 RX ORDER — LIDOCAINE HYDROCHLORIDE 10 MG/ML
0.5 INJECTION, SOLUTION INFILTRATION; PERINEURAL ONCE AS NEEDED
Status: DISCONTINUED | OUTPATIENT
Start: 2023-08-28 | End: 2023-08-28 | Stop reason: HOSPADM

## 2023-08-28 RX ADMIN — SODIUM CHLORIDE, POTASSIUM CHLORIDE, SODIUM LACTATE AND CALCIUM CHLORIDE 1000 ML: 600; 310; 30; 20 INJECTION, SOLUTION INTRAVENOUS at 14:45

## 2023-08-28 RX ADMIN — PROPOFOL 200 MCG/KG/MIN: 10 INJECTION, EMULSION INTRAVENOUS at 15:56

## 2023-08-28 RX ADMIN — LIDOCAINE HYDROCHLORIDE 100 MG: 20 INJECTION, SOLUTION INFILTRATION; PERINEURAL at 15:55

## 2023-08-28 NOTE — ANESTHESIA PREPROCEDURE EVALUATION
Anesthesia Evaluation     Patient summary reviewed and Nursing notes reviewed   history of anesthetic complications:  PONV               Airway   Mallampati: III  TM distance: >3 FB  Neck ROM: full  Dental      Pulmonary - negative pulmonary ROS   Cardiovascular - negative cardio ROS    Rhythm: regular  Rate: normal        Neuro/Psych- negative ROS  GI/Hepatic/Renal/Endo    (+) GERD    Musculoskeletal (-) negative ROS    Abdominal    Substance History - negative use     OB/GYN negative ob/gyn ROS         Other                      Anesthesia Plan    ASA 2     MAC     intravenous induction     Anesthetic plan, risks, benefits, and alternatives have been provided, discussed and informed consent has been obtained with: patient.    Plan discussed with CRNA.    CODE STATUS:

## 2023-08-28 NOTE — BRIEF OP NOTE
ESOPHAGOGASTRODUODENOSCOPY, COLONOSCOPY  Progress Note    Cleo Menon  8/28/2023    Pre-op Diagnosis:   Lower abdominal pain [R10.30]  Diarrhea, unspecified type [R19.7]  Other iron deficiency anemia [D50.8]  Nausea and vomiting, unspecified vomiting type [R11.2]  Gastroesophageal reflux disease, unspecified whether esophagitis present [K21.9]  Weight loss [R63.4]       Post-Op Diagnosis Codes:     * Lower abdominal pain [R10.30]     * Diarrhea, unspecified type [R19.7]     * Other iron deficiency anemia [D50.8]     * Nausea and vomiting, unspecified vomiting type [R11.2]     * Gastroesophageal reflux disease, unspecified whether esophagitis present [K21.9]     * Weight loss [R63.4]     * Internal hemorrhoids [K64.8]     * Gastritis [K29.70]     * Hiatal hernia [K44.9]     * Erosive esophagitis [K22.10]    Procedure/CPTr Codes:        Procedure(s):  ESOPHAGOGASTRODUODENOSCOPY with cold biopsies  COLONOSCOPY to cecum and TI with cold biopsies              Surgeon(s):  Mario Jordan MD    Anesthesia: Monitored Anesthesia Care    Staff:   Endo Technician: Kenna Hernandez  Endo Nurse: Jailene Kim RN         Estimated Blood Loss: minimal    Urine Voided: * No values recorded between 8/28/2023  3:51 PM and 8/28/2023  4:24 PM *    Specimens:                Specimens       ID Source Type Tests Collected By Collected At Frozen?    A Large Intestine, Left / Descending Colon Tissue TISSUE PATHOLOGY EXAM   Mario Jordan MD 8/28/23 2165 No    Description: descending colon biopsies    This specimen was not marked as sent.    B Large Intestine, Rectum Tissue TISSUE PATHOLOGY EXAM   Mario Jordan MD 8/28/23 1609 No    Description: rectal biopsies    This specimen was not marked as sent.    C Small Intestine Tissue TISSUE PATHOLOGY EXAM   Mario Jordan MD 8/28/23 1620 No    Description: duodenal biopsies    Comment: Rule out celiac    This specimen was not marked as sent.    D Gastric, Antrum  Tissue TISSUE PATHOLOGY EXAM   Mario Jordan MD 8/28/23 1620 No    Description: antral biopsies    This specimen was not marked as sent.                  Drains: * No LDAs found *    Findings: Colonoscopy to the terminal ileum with normal ileal mucosa.  Internal hemorrhoids were noted the remainder the colon mucosa was normal.  Biopsies of the descending and rectum were obtained to rule out microscopic colitis.  EGD with biopsy performed showing normal duodenum throughout biopsies duodenum rule out celiac.  Diffuse antral gastritis noted biopsies of the antrum were obtained.  Retroflexion of the GE junction did show some medium sliding hiatal hernia.  Esophageal mucosa was significant for a 4 mm erosion just above the GE junction.  Remainder the esophageal mucosa was normal.        Complications: None          Mario Jordan MD     Date: 8/28/2023  Time: 16:27 EDT

## 2023-08-28 NOTE — ANESTHESIA POSTPROCEDURE EVALUATION
Patient: Cleo Menon    Procedure Summary       Date: 08/28/23 [facility]    Anesthesia Start: 1549 Anesthesia Stop: 1627    Procedures:       ESOPHAGOGASTRODUODENOSCOPY with cold biopsies (Esophagus)      COLONOSCOPY to cecum and TI with cold biopsies Diagnosis:       Lower abdominal pain      Diarrhea, unspecified type      Other iron deficiency anemia      Nausea and vomiting, unspecified vomiting type      Gastroesophageal reflux disease, unspecified whether esophagitis present      Weight loss      Internal hemorrhoids      Gastritis      Hiatal hernia      Erosive esophagitis      (Lower abdominal pain [R10.30])      (Diarrhea, unspecified type [R19.7])      (Other iron deficiency anemia [D50.8])      (Nausea and vomiting, unspecified vomiting type [R11.2])      (Gastroesophageal reflux disease, unspecified whether esophagitis present [K21.9])      (Weight loss [R63.4])    Surgeons: Mario Jordan MD Provider: Ventura José MD    Anesthesia Type: MAC ASA Status: 2            Anesthesia Type: MAC    Vitals  No vitals data found for the desired time range.          Post Anesthesia Care and Evaluation    Patient location during evaluation: bedside  Pain management: adequate    Airway patency: patent  Anesthetic complications: No anesthetic complications    Cardiovascular status: acceptable  Respiratory status: acceptable  Hydration status: acceptable

## 2023-08-28 NOTE — DISCHARGE INSTRUCTIONS
For the next 24 hours patient needs to be with a responsible adult.    For 24 hours DO NOT drive, operate machinery, appliances, drink alcohol, make important decisions or sign legal documents.    Start with a light or bland diet if you are feeling sick to your stomach otherwise advance to regular diet as tolerated.    Follow recommendations on procedure report if provided by your doctor.    Call Dr Jordan for problems [phone].    Problems may include but not limited to: large amounts of bleeding, trouble breathing, repeated vomiting, severe unrelieved pain, fever or chills.

## 2023-08-29 NOTE — TELEPHONE ENCOUNTER
"Prior authorization has been approved.    \"PA Case: [id number], Status: Approved, Coverage Starts on: 5/29/2023 12:00:00 AM, Coverage Ends on: 8/27/2024 12:00:00 AM.\"    Mychart message sent.  "

## 2023-08-30 LAB
LAB AP CASE REPORT: NORMAL
PATH REPORT.FINAL DX SPEC: NORMAL
PATH REPORT.GROSS SPEC: NORMAL

## 2023-08-31 NOTE — TELEPHONE ENCOUNTER
I checked the chart and the denial letter was scanned in the chart yesterday under media. Encounter sent to RN's to inform.

## 2023-09-07 NOTE — TELEPHONE ENCOUNTER
Spoke to pt, she thinks she may have already picked up prescription for SL tablets. Let her know we will send RX for regular tablets and they should be covered by her insurance and she can fill the new prescription when she needs a refill. Patient verbalized understanding.  Bre Nicole, PharmD, BCACP, BCPS, BCCCP

## 2023-10-12 ENCOUNTER — TELEPHONE (OUTPATIENT)
Dept: GASTROENTEROLOGY | Facility: CLINIC | Age: 48
End: 2023-10-12
Payer: MEDICARE

## 2023-10-12 NOTE — TELEPHONE ENCOUNTER
Caller: Cleo Menon     Relationship: [unfilled]     Best call back number: [phone]     What is your medical concern? PT THINKS SHE HAS A HEMMORHOID THAT HAS PROLAPSED. PT WOULD LIKE A CALLBACK FROM CLINICAL DUE TO APPT NOT UNTIL 10/26/23.   How long has this issue been going on? FOR A WEEK    Is your provider already aware of this issue? NO    Have you been treated for this issue? NO

## 2023-10-12 NOTE — TELEPHONE ENCOUNTER
Returned patient's phone call. She states she has an external hemorrhoid. She states was straining for a BM and this is when she noticed the hemorrhoid. Small amount of bright red blood noticed on the toilet paper only.    Reports continued abdominal cramping. Taking Hyoscyamine for the cramping but has very little relief.     Patient reports she is using Prep H cream to the hemorrhoid and it has not helped.     Advised patient to avoid foods high in fiber, advised to eat a low residue diet. Advised on the importance of not straining for a BM.     Advised an update will be sent to Katy for advisement. She verb understanding.

## 2023-10-13 ENCOUNTER — TELEPHONE (OUTPATIENT)
Dept: GASTROENTEROLOGY | Facility: CLINIC | Age: 48
End: 2023-10-13
Payer: MEDICARE

## 2023-10-13 NOTE — TELEPHONE ENCOUNTER
10/13/23 - She called me while on call complaining that someone was supposed to get back to her about her diarrhea (10-20 nonbloody BM/day) and lower abdominal cramping.       I reviewed her case with her.  She has no rectal bleeding.  No fevers.  Her weight has been stable. She did have a colonoscopy by Dr. Troy Jordan in 8 of 2023 showed internal hemorrhoids.  The colon biopsies did not show inflammation.  She also had stool studies and a CAT scan of the abdomen and pelvis in 8/23.  I reviewed all of her medicines with her including a medicine for her pulmonary hypertension called Treprostinil. In looking this up in up-to-date it can cause diarrhea.  It was just started in February and her diarrhea started in June.  According to the patient the doc that has her on this medicine thinks that it will be lifesaving for her so they want to keep her on this.  I recommended that she start taking Imodium 2 pills every morning to see if that will slow her down enough thinking that her diarrhea very well could be medication induced?  She is going to follow-up with JODEE Sung later this month.   SD - FYI.  Thx.KJH

## 2023-10-17 RX ORDER — HYDROCORTISONE ACETATE 25 MG/1
25 SUPPOSITORY RECTAL NIGHTLY
Qty: 10 SUPPOSITORY | Refills: 0 | Status: SHIPPED | OUTPATIENT
Start: 2023-10-17 | End: 2023-10-27

## 2023-10-26 ENCOUNTER — OFFICE VISIT (OUTPATIENT)
Dept: GASTROENTEROLOGY | Facility: CLINIC | Age: 48
End: 2023-10-26
Payer: MEDICARE

## 2023-10-26 VITALS
DIASTOLIC BLOOD PRESSURE: 71 MMHG | HEIGHT: 63 IN | BODY MASS INDEX: 31.86 KG/M2 | SYSTOLIC BLOOD PRESSURE: 109 MMHG | HEART RATE: 80 BPM | OXYGEN SATURATION: 92 % | WEIGHT: 179.8 LBS | TEMPERATURE: 98 F

## 2023-10-26 DIAGNOSIS — R19.7 DIARRHEA, UNSPECIFIED TYPE: ICD-10-CM

## 2023-10-26 DIAGNOSIS — R63.4 WEIGHT LOSS: ICD-10-CM

## 2023-10-26 DIAGNOSIS — R93.3 ABNORMAL FINDING ON GI TRACT IMAGING: ICD-10-CM

## 2023-10-26 DIAGNOSIS — K21.9 GASTROESOPHAGEAL REFLUX DISEASE, UNSPECIFIED WHETHER ESOPHAGITIS PRESENT: Primary | ICD-10-CM

## 2023-10-26 DIAGNOSIS — R10.30 LOWER ABDOMINAL PAIN: ICD-10-CM

## 2023-10-26 DIAGNOSIS — K76.0 HEPATIC STEATOSIS: ICD-10-CM

## 2023-10-26 RX ORDER — IBUPROFEN 600 MG/1
TABLET ORAL
COMMUNITY
Start: 2023-08-30

## 2023-10-26 RX ORDER — ESOMEPRAZOLE MAGNESIUM 40 MG/1
40 CAPSULE, DELAYED RELEASE ORAL 2 TIMES DAILY
Qty: 180 CAPSULE | Refills: 3 | Status: SHIPPED | OUTPATIENT
Start: 2023-10-26

## 2023-10-26 RX ORDER — TREPROSTINIL 5 MG/ML
INJECTION, SOLUTION INTRAVENOUS; SUBCUTANEOUS
COMMUNITY

## 2023-10-26 NOTE — Clinical Note
49yo female with diarrhea since March/April 2023. Presented to ED several times this summer with abdominal pain. CT at that time showed colitis. Treated with abx in hospital, but not discharged on any as she was eventually diagnosed with viral gastroenteritis. EGD/CSY - non bleeding erosion in esophagus and internal hemorrhoids. Otherwise unremarkable. C Diff 09/23 negative. No blood, fevers/chills. Only 2 BM/day on Imodium, but 10/day if she does not take it. I was thinking medication induced diarrhea vs post infectious IBS. I encouraged her to continue Imodium as that is helping for now. If that is not enough do you think a trial of Xifaxin would be beneficial? I also discussed Ibgard for abd pain, but was considering Bentyl if that does not help.

## 2023-10-26 NOTE — PROGRESS NOTES
Chief Complaint  Diarrhea, Abdominal Pain, and Heartburn (A lot better)    Subjective          History of Present Illness    Cleo Menon is a  48 y.o. female presents for follow-up of lower abdominal pain, diarrhea, iron deficiency anemia, nausea/vomiting and GERD.  She is a patient of Dr. Jordan is new to me.  Patient was most recently seen by NOAR Solano in the office on 8/21/2023.  She has a past medical history of generalized anxiety disorder, lap wai, pulmonary hypertension and ventricular septal defect repair 1981.    Patient was admitted to hospital multiple times in June for colitis and dehydration and was placed on Zosyn while in the hospital but this was not continued after discharge as she was diagnosed with viral gastroenteritis.  CT abdomen pelvis with contrast at that time revealed hepatic steatosis, thickening and edema throughout the colon wall and rectal wall and diverticulosis of descending and sigmoid colon, and tubular cystic structure in left pelvis unchanged from prior imaging.  She presented to the emergency room again the beginning of August with complaints of abdominal pain, nausea and vomiting.  Repeat CT at that time showed unchanged tubular cystic lesion in left pelvis but was otherwise unremarkable.  GI PCR was negative at that time she was given a trial of Bentyl.    She saw Katy the end of August and subsequently underwent EGD and colonoscopy which showed single nonbleeding erosion in the lower third of the esophagus, medium size hiatal hernia, erythematous mucosa in the antrum (biopsied) and normal examined duodenum.  Colonoscopy showed nonbleeding internal hemorrhoids and otherwise normal exam. Biopsies did not show inflammation.     Called Dr. Kim while he was on call 10/13 for diarrhea and was recommended Imodium 2 pills every morning.     Today she well appearing, reports she is still struggling with GI symptoms but says when she takes 2 immodium in the morning  "she only has 2 bowel movements every day. Without the Imodium she has between 8-10. She reports generalized abdominal discomfort worse in the epigastric region after eating as well which has been present for several months. States that this pain does not radiate anywhere. She says her diarrhea and abdominal pain began back in March or April after traveling home to [place]. She also reports starting Treprostinil for pulmonary hypertension towards the end of February. She reports weight loss over the last few months due to not being able to eat as much food as usual because of the diarrhea. Denies fevers/chills, vomiting, hematemesis, coffee ground emesis, melena or hematochezia. States she is having surgery next Tuesday to remove tubular cyst noted in pelvis on CT from June and August.    Labs from 10/17 show Hgb 11.7. Lipase noted to be normal in August.       Objective   Vital Signs:   /71   Pulse 80   Temp 98 °F (36.7 °C)   Ht 160 cm (63\")   Wt 81.6 kg (179 lb 12.8 oz)   SpO2 92%   BMI 31.85 kg/m²       Physical Exam  Constitutional:       General: She is not in acute distress.     Appearance: Normal appearance.   Eyes:      General: No scleral icterus.  Pulmonary:      Effort: Pulmonary effort is normal.   Abdominal:      General: Abdomen is flat.      Palpations: Abdomen is soft.      Tenderness: There is abdominal tenderness.      Comments: General tenderness to palpation.    Genitourinary:     Comments: Rectal exam offered, but declined by patient.   Skin:     Coloration: Skin is not jaundiced.   Neurological:      General: No focal deficit present.      Mental Status: She is alert and oriented to person, place, and time.   Psychiatric:         Mood and Affect: Mood normal.         Behavior: Behavior normal.          Result Review :   The following data was reviewed by: Melly Tamayo PA-C on 10/26/2023:  CMP          6/9/2023    07:44 6/10/2023    06:12 8/3/2023    17:44   CMP   Glucose 100  " 98  107    BUN 5  4  7    Creatinine 0.69  0.63  0.75    EGFR 108.5  111.0  99.6    Sodium 140  139  141    Potassium 4.8  3.7  3.2    Chloride 109  108  103    Calcium 8.2  8.5  9.5    Total Protein   7.1    Albumin   4.7    Globulin   2.4    Total Bilirubin   0.6    Alkaline Phosphatase   68    AST (SGOT)   19    ALT (SGPT)   17    Albumin/Globulin Ratio   2.0    BUN/Creatinine Ratio 7.2  6.3  9.3    Anion Gap 8.9  7.4  13.0      CBC          8/3/2023    17:44 8/21/2023    10:57 10/17/2023    10:09   CBC   WBC 3.08  2.98  3.14       RBC 3.75  4.06  4.31       Hemoglobin 10.4  10.8  11.7       Hematocrit 31.4  33.6  35.8       MCV 83.7  82.8  83       MCH 27.7  26.6  27.1       MCHC 33.1  32.1  32.7       RDW 13.6  13.6  14.4       Platelets 212  174  202          Details          This result is from an external source.               I reviewed colonoscopy and EGD from 09/2023      Assessment:   Diagnoses and all orders for this visit:    1. Gastroesophageal reflux disease, unspecified whether esophagitis present (Primary)  -     esomeprazole (nexIUM) 40 MG capsule; Take 1 capsule by mouth 2 (Two) Times a Day.  Dispense: 180 capsule; Refill: 3    2. Diarrhea, unspecified type    3. Abnormal finding on GI tract imaging    4. Lower abdominal pain    5. Weight loss    6. Hepatic steatosis          Plan:   -Diarrhea could possibly be medication induced - related to Treprostinil or possibly post infectious IBS after episode of viral enteritis in June. Patient states it is necessary to remain on Treprostinil for her pulmonary hypertension. Continue Imodium daily as she is only having 2 bowel movements/day on this and feels like she is able to live her life as long as she takes the Imodium. If we need to, could consider colestipol or maybe trial of Xifaxin in the future?   -Consider Ibgard for abdominal discomfort surrounding eating, may benefit from Bentyl. She does have epigastric pain/tenderness to palpation, but this  has been since the early summer. Lipase was normal in August and CT June and August did not show any findings concerning for pancreatitis.    -For hepatic steatosis, recommend healthy diet and exercise. LFTs normal in 08/2023. Monitor.        Follow Up   Return in about 4 weeks (around 11/23/2023).    Dragon dictation used throughout this note.         Melly Tamayo PA-C  [facility]  [address] [place] [address]  Office: [phone]

## 2023-11-22 ENCOUNTER — OFFICE VISIT (OUTPATIENT)
Dept: GASTROENTEROLOGY | Facility: CLINIC | Age: 48
End: 2023-11-22
Payer: MEDICARE

## 2023-11-22 VITALS
DIASTOLIC BLOOD PRESSURE: 73 MMHG | HEIGHT: 63 IN | SYSTOLIC BLOOD PRESSURE: 114 MMHG | HEART RATE: 99 BPM | WEIGHT: 175.9 LBS | TEMPERATURE: 97.7 F | BODY MASS INDEX: 31.17 KG/M2

## 2023-11-22 DIAGNOSIS — K20.80 PILL ESOPHAGITIS: ICD-10-CM

## 2023-11-22 DIAGNOSIS — T50.905A PILL ESOPHAGITIS: ICD-10-CM

## 2023-11-22 DIAGNOSIS — K58.0 IRRITABLE BOWEL SYNDROME WITH DIARRHEA: ICD-10-CM

## 2023-11-22 DIAGNOSIS — R19.7 DIARRHEA, UNSPECIFIED TYPE: Primary | ICD-10-CM

## 2023-11-22 RX ORDER — SUCRALFATE ORAL 1 G/10ML
1 SUSPENSION ORAL 3 TIMES DAILY
Qty: 900 ML | Refills: 0 | Status: SHIPPED | OUTPATIENT
Start: 2023-11-22 | End: 2023-12-22

## 2023-11-22 NOTE — Clinical Note
49 yo F who presented today for f/u for diarrhea. Mostly maintained on Imodium but still having issues. Recommended trial of Xifaxan 550 tid for 14 days. Also reporting discomfort when taking potassium supplement. No difficulty swallowing solids, liquids or other pills. Suspected pill esophagitis and recommended continuing on PPI, added sucralfate and recommended discussing transitioning to powder or liquid form of potassium supplement with PCP.

## 2023-11-22 NOTE — PROGRESS NOTES
"Chief Complaint  Heartburn    Subjective          History of Present Illness    Cleo Menon is a  48 y.o. female presents for follow up for diarrhea. She is a patient of Dr. Jordan.     Today she reports she is doing ok. Says she recently had surgery for removal of ovarian cysts and had worsening diarrhea following that. Reports her diarrhea is generally controlled with Imodium, but states she wishes it was better. She also reports new difficulty tolerating her potassium supplement - states it is a big pill that gets stuck in her throat. Denies issues swallowing solids, liquids or other smaller pills. No hematemesis, coffee ground emesis, melena, hematochezia, weight loss, abdominal pain.     EGD and colonoscopy 08/28/2023 showed single nonbleeding erosion in the lower third of the esophagus, medium size hiatal hernia, erythematous mucosa in the antrum (biopsied) and normal examined duodenum.  Colonoscopy showed nonbleeding internal hemorrhoids and otherwise normal exam. Biopsies did not show inflammation.     Objective   Vital Signs:   /73   Pulse 99   Temp 97.7 °F (36.5 °C)   Ht 160 cm (63\")   Wt 79.8 kg (175 lb 14.4 oz)   BMI 31.16 kg/m²       Physical Exam  Constitutional:       General: She is not in acute distress.     Appearance: Normal appearance.   Eyes:      General: No scleral icterus.  Cardiovascular:      Rate and Rhythm: Normal rate.   Pulmonary:      Effort: Pulmonary effort is normal.   Abdominal:      General: Abdomen is flat. Bowel sounds are normal. There is no distension.      Tenderness: Tenderness: General tenderness to palpation. There is no guarding.   Skin:     Coloration: Skin is not jaundiced.   Neurological:      General: No focal deficit present.      Mental Status: She is alert and oriented to person, place, and time.   Psychiatric:         Mood and Affect: Mood normal.         Behavior: Behavior normal.          Result Review :   The following data was reviewed by: " Melly Tamayo PA-C on 11/22/2023:  CMP          6/9/2023    07:44 6/10/2023    06:12 8/3/2023    17:44   CMP   Glucose 100  98  107    BUN 5  4  7    Creatinine 0.69  0.63  0.75    EGFR 108.5  111.0  99.6    Sodium 140  139  141    Potassium 4.8  3.7  3.2    Chloride 109  108  103    Calcium 8.2  8.5  9.5    Total Protein   7.1    Albumin   4.7    Globulin   2.4    Total Bilirubin   0.6    Alkaline Phosphatase   68    AST (SGOT)   19    ALT (SGPT)   17    Albumin/Globulin Ratio   2.0    BUN/Creatinine Ratio 7.2  6.3  9.3    Anion Gap 8.9  7.4  13.0      CBC          8/21/2023    10:57 10/17/2023    10:09 11/3/2023    14:37   CBC   WBC 2.98  3.14     3.53       RBC 4.06  4.31     3.80       Hemoglobin 10.8  11.7     10.3       Hematocrit 33.6  35.8     31.7       MCV 82.8  83     83       MCH 26.6  27.1     27.1       MCHC 32.1  32.7     32.5       RDW 13.6  14.4     14.7       Platelets 174  202     181          Details          This result is from an external source.               I reviewed EGD and colonoscopy from 09/2023.       Assessment:   Diagnoses and all orders for this visit:    1. Diarrhea, unspecified type (Primary)    2. Irritable bowel syndrome with diarrhea  -     riFAXIMin (Xifaxan) 550 MG tablet; Take 1 tablet by mouth 3 (Three) Times a Day.  Dispense: 42 tablet; Refill: 2    3. Pill esophagitis  -     sucralfate (Carafate) 1 GM/10ML suspension; Take 10 mL by mouth 3 (Three) Times a Day for 30 days.  Dispense: 900 mL; Refill: 0          Plan:   Difficulty swallowing potassium supplement likely due to pill esophagitis given size of pill. Recommend discussing powder or liquid form of potassium supplement instead with PCP. In the meantime recommend continuing PPI twice daily and will also add sucralfate  Regarding diarrhea, she reports abdominal cramping relieved with bowel movements in line with IBS-D will trial Xifaxan 550 tid for 14 days and see if that causes improvement in symptoms   Can also  use imodium as needed         Follow Up   No follow-ups on file.    Dragon dictation used throughout this note.         Melly Tamayo PA-C  [facility]  [address] [place] [address]  Office: [phone]

## 2023-11-27 ENCOUNTER — DOCUMENTATION (OUTPATIENT)
Dept: GASTROENTEROLOGY | Facility: CLINIC | Age: 48
End: 2023-11-27
Payer: MEDICARE

## 2023-11-27 NOTE — PROGRESS NOTES
PA approved for Xifaxan  Key: [id number]  PA Case: [id number], Status: Approved, Coverage Starts on: 8/28/2023 12:00:00 AM, Coverage Ends on: 11/26/2024 12:00:00 AM.  $0 copay       Yanni Rossi  Specialty Pharmacy Technician

## 2023-12-11 ENCOUNTER — TELEPHONE (OUTPATIENT)
Dept: GASTROENTEROLOGY | Facility: CLINIC | Age: 48
End: 2023-12-11
Payer: MEDICARE

## 2023-12-11 NOTE — TELEPHONE ENCOUNTER
----- Message from Cleo Menon sent at 12/9/2023  3:09 AM EST -----  Regarding: Prior Authorization  Contact: [phone]  Also my cardiologist said that I can’t change this medication because of a side effect, because it’s life saving. I have been on other medications for pulmonary Hypertension and it didn’t affect me this way but since we’ve tried other methods and this one seems to be working well they don’t want to change it. Sucks because this is life altering for me. I’m a busy person and now I have to watch what I eat and how long I have before I need to go to the bathroom. I am tired. I feel like my stomach issues have taken up 8 or 9months of my life. I just want to go to the bathroom and not worry about it. Thanks for listening.     Cleo Menon  You2 days ago     EW  So I’ve tried all above methods to help. There asked that I message you guys after I finished meds. The xifaxan does help but my hemorrhoids are awful. I am having trouble sitting and it’s unbearable. I dread going to the bathroom. I drank that med as well it seemed to give me tummy aches and hasn’t seems to lift any of the discomfort I have when I swallow. I am a whole mess and I hate it. I felt like adding one Imodium a day was going to be the change I needed and it did help but now it seems like it’s not helping at all. Maybe 2 Imodium a day. I don’t know. Let me know if coming in to show you the hemorrhoids might be an option. It’s truly painful. Thanks in advance. ~Siena

## 2023-12-13 DIAGNOSIS — K64.9 HEMORRHOIDS, UNSPECIFIED HEMORRHOID TYPE: Primary | ICD-10-CM

## 2023-12-13 DIAGNOSIS — K62.89 RECTAL DISCOMFORT: ICD-10-CM

## 2023-12-13 RX ORDER — HYDROCORTISONE ACETATE 25 MG/1
25 SUPPOSITORY RECTAL NIGHTLY PRN
Qty: 30 SUPPOSITORY | Refills: 1 | Status: SHIPPED | OUTPATIENT
Start: 2023-12-13

## 2023-12-13 NOTE — TELEPHONE ENCOUNTER
Per Melly:  I can send her some anusol suppositories to help with the hemorrhoids. I think it might bee good for her to come in and see someone. She is a patient of Dr. Jordan so maybe she can follow up soon with him or Katy?     Thank you,   Melly Tamayo PA-C     Called patient and informed her per Melly's message. She was agreeable to the suppositories and is scheduled with Katy on 1/26/24

## 2024-02-26 ENCOUNTER — OFFICE VISIT (OUTPATIENT)
Dept: GASTROENTEROLOGY | Facility: CLINIC | Age: 49
End: 2024-02-26
Payer: MEDICARE

## 2024-02-26 VITALS
SYSTOLIC BLOOD PRESSURE: 114 MMHG | TEMPERATURE: 97.8 F | HEIGHT: 63 IN | BODY MASS INDEX: 31.4 KG/M2 | HEART RATE: 91 BPM | WEIGHT: 177.2 LBS | DIASTOLIC BLOOD PRESSURE: 68 MMHG

## 2024-02-26 DIAGNOSIS — K21.9 GASTROESOPHAGEAL REFLUX DISEASE, UNSPECIFIED WHETHER ESOPHAGITIS PRESENT: ICD-10-CM

## 2024-02-26 DIAGNOSIS — K58.0 IRRITABLE BOWEL SYNDROME WITH DIARRHEA: Primary | ICD-10-CM

## 2024-02-26 DIAGNOSIS — R13.10 DYSPHAGIA, UNSPECIFIED TYPE: ICD-10-CM

## 2024-02-26 DIAGNOSIS — Z91.09 OTHER ALLERGY STATUS, OTHER THAN TO DRUGS AND BIOLOGICAL SUBSTANCES: ICD-10-CM

## 2024-02-26 DIAGNOSIS — R68.89 OTHER GENERAL SYMPTOMS AND SIGNS: ICD-10-CM

## 2024-02-26 PROCEDURE — 99214 OFFICE O/P EST MOD 30 MIN: CPT | Performed by: NURSE PRACTITIONER

## 2024-02-26 PROCEDURE — 1159F MED LIST DOCD IN RCRD: CPT | Performed by: NURSE PRACTITIONER

## 2024-02-26 PROCEDURE — 1160F RVW MEDS BY RX/DR IN RCRD: CPT | Performed by: NURSE PRACTITIONER

## 2024-02-26 RX ORDER — MONTELUKAST SODIUM 4 MG/1
TABLET, CHEWABLE ORAL
Qty: 60 TABLET | Refills: 11 | Status: SHIPPED | OUTPATIENT
Start: 2024-02-26

## 2024-02-26 NOTE — PROGRESS NOTES
Chief Complaint   Patient presents with    Diarrhea       HPI    Cleo Menon is a  48 y.o. female here for a follow up visit for GERD and irritable bowel syndrome.    This patient follows with myself and Dr. Jordan.    Past medical history of gallstone related pancreatitis, lap wai, pulmonary hypertension along with ventral septal defect repaired in .    EGD and colonoscopy 2023 showed single nonbleeding erosion in the lower third of the esophagus, medium size hiatal hernia, erythematous mucosa in the antrum and normal examined duodenum.  Colonoscopy showed nonbleeding internal hemorrhoids and otherwise normal exam. Biopsies did not show inflammation.     On visit today she reports dysphagia symptoms that come and go.  Issues with solids only.  She takes Nexium 40 mg once daily which seems to control GERD symptoms.  She is not on an antireflux diet.  No nausea or vomiting.  Appetite is good but she frequently avoids eating to prevent diarrhea.  She has lost 20 pounds over the last couple of years.  Her weight has been stable for the last 6 months.    She still gets postprandial diarrhea.  Up to 6 liquid stools a day on average.  Associated hemorrhoidal discomfort without rectal bleeding or rectal pain.  She is currently on 1 tablet of Imodium daily but does wish her bowel pattern was better.  She has not tried Colestid.    Past Medical History:   Diagnosis Date    Anemia All my life    Cholelithiasis     When i was pregnant  had removed     Coronary artery disease Birth    Had a vsd repair in     Enlarged heart     GERD (gastroesophageal reflux disease)     Pancreatitis     7guallstones stuck in bile duct. Got so sick i also had pancreatitis    PONV (postoperative nausea and vomiting)     Pulmonary hypertension     VSD (ventricular septal defect)     repair done        Past Surgical History:   Procedure Laterality Date    ABDOMINAL SURGERY  C section    [id number]      SECTION      CHOLECYSTECTOMY      COLONOSCOPY N/A 08/28/2023    Procedure: COLONOSCOPY to cecum and TI with cold biopsies;  Surgeon: Mario Jordan MD;  Location: [facility] ENDOSCOPY;  Service: Gastroenterology;  Laterality: N/A;  pre: abdominal pain, diarrhea  post: diverticulosis, hemorrhoids    CYSTOSCOPY BOTOX INJECTION OF BLADDER      ENDOSCOPY N/A 08/28/2023    Procedure: ESOPHAGOGASTRODUODENOSCOPY with cold biopsies;  Surgeon: Mario Jordan MD;  Location: [facility] ENDOSCOPY;  Service: Gastroenterology;  Laterality: N/A;  pre: nausea, vomitting, abdominal pain  post: hiatal hernia, gastritis, erosive esophagitis    LUMBAR SPINAL CORD TUMOR REMOVAL      TUBAL ABDOMINAL LIGATION  2006    UPPER GASTROINTESTINAL ENDOSCOPY      VSD REPAIR         Scheduled Meds:     Continuous Infusions: No current facility-administered medications for this visit.      PRN Meds:     Allergies   Allergen Reactions    Adhesive Tape Rash    Amoxicillin Hives and Itching    Nsaids GI Bleeding and Other (See Comments)    Wound Dressing Adhesive Rash    Latex Rash and Unknown (See Comments)       Social History     Socioeconomic History    Marital status: Significant Other   Tobacco Use    Smoking status: Never    Smokeless tobacco: Never   Vaping Use    Vaping Use: Never used   Substance and Sexual Activity    Alcohol use: Never    Drug use: Never    Sexual activity: Yes     Partners: Male     Birth control/protection: I.U.D.       History reviewed. No pertinent family history.    Review of Systems   HENT:  Positive for trouble swallowing.    Gastrointestinal:  Positive for diarrhea.       Vitals:    02/26/24 1000   BP: 114/68   Pulse: 91   Temp: 97.8 °F (36.6 °C)       Physical Exam  Constitutional:       Appearance: She is well-developed.   Abdominal:      General: Bowel sounds are normal. There is no distension.      Palpations: Abdomen is soft. There is no mass.      Tenderness: There is no abdominal tenderness. There is no  guarding.      Hernia: No hernia is present.   Skin:     General: Skin is warm and dry.      Capillary Refill: Capillary refill takes less than 2 seconds.   Neurological:      Mental Status: She is alert and oriented to person, place, and time.   Psychiatric:         Behavior: Behavior normal.     Assessment    Diagnoses and all orders for this visit:    Irritable bowel syndrome with diarrhea (Primary)    Dysphagia, unspecified type    Gastroesophageal reflux disease, unspecified whether esophagitis present    Other orders  -     colestipol (COLESTID) 1 g tablet; Start with one pill daily and increase to 2 daily if no improvement after 2 weeks  Dispense: 60 tablet; Refill: 11    Plan    Arrange esophagram for further evaluation of symptoms  Continue Nexium  Antireflux measures and dietary modifications reviewed. Low acid diet reviewed. Keep head of bed elevated. Stop eating/drinking at least 3 hours prior to bedtime. Eliminate caffeine and carbonated beverages.  Weight loss encouraged if BMI over 25.    Regarding diarrhea recommend trial of Colestid as above.  Hold Imodium while trialing Colestid.  CBC, CMP, TSH and food allergy profile today.  Monitor for symptom improvement with trial of bile acid sequestrant's.  Discussed the timing of Colestid to avoid a binding effect with her other medications and all questions were answered.    Further recommendations and follow-up pending imaging.         NORA Marques  [facility]  [address] [place] [address]  Office: [phone]

## 2024-02-27 LAB
ALBUMIN SERPL-MCNC: 4.6 G/DL (ref 3.9–4.9)
ALBUMIN/GLOB SERPL: 1.8 {RATIO} (ref 1.2–2.2)
ALP SERPL-CCNC: 61 IU/L (ref 44–121)
ALT SERPL-CCNC: 7 IU/L (ref 0–32)
AST SERPL-CCNC: 10 IU/L (ref 0–40)
BILIRUB SERPL-MCNC: 0.4 MG/DL (ref 0–1.2)
BUN SERPL-MCNC: 14 MG/DL (ref 6–24)
BUN/CREAT SERPL: 18 (ref 9–23)
CALCIUM SERPL-MCNC: 9.3 MG/DL (ref 8.7–10.2)
CHLORIDE SERPL-SCNC: 104 MMOL/L (ref 96–106)
CO2 SERPL-SCNC: 23 MMOL/L (ref 20–29)
CREAT SERPL-MCNC: 0.77 MG/DL (ref 0.57–1)
EGFRCR SERPLBLD CKD-EPI 2021: 96 ML/MIN/1.73
ERYTHROCYTE [DISTWIDTH] IN BLOOD BY AUTOMATED COUNT: 15.1 % (ref 11.7–15.4)
GLOBULIN SER CALC-MCNC: 2.6 G/DL (ref 1.5–4.5)
GLUCOSE SERPL-MCNC: 88 MG/DL (ref 70–99)
HCT VFR BLD AUTO: 34.9 % (ref 34–46.6)
HGB BLD-MCNC: 11.1 G/DL (ref 11.1–15.9)
MCH RBC QN AUTO: 25.5 PG (ref 26.6–33)
MCHC RBC AUTO-ENTMCNC: 31.8 G/DL (ref 31.5–35.7)
MCV RBC AUTO: 80 FL (ref 79–97)
PLATELET # BLD AUTO: 178 X10E3/UL (ref 150–450)
POTASSIUM SERPL-SCNC: 4 MMOL/L (ref 3.5–5.2)
PROT SERPL-MCNC: 7.2 G/DL (ref 6–8.5)
RBC # BLD AUTO: 4.35 X10E6/UL (ref 3.77–5.28)
SODIUM SERPL-SCNC: 141 MMOL/L (ref 134–144)
TSH SERPL DL<=0.005 MIU/L-ACNC: 3.18 UIU/ML (ref 0.45–4.5)
WBC # BLD AUTO: 3.2 X10E3/UL (ref 3.4–10.8)

## 2024-03-01 ENCOUNTER — TELEPHONE (OUTPATIENT)
Dept: GASTROENTEROLOGY | Facility: CLINIC | Age: 49
End: 2024-03-01
Payer: MEDICARE

## 2024-03-01 DIAGNOSIS — K21.9 GASTROESOPHAGEAL REFLUX DISEASE, UNSPECIFIED WHETHER ESOPHAGITIS PRESENT: ICD-10-CM

## 2024-03-01 DIAGNOSIS — R13.10 DYSPHAGIA, UNSPECIFIED TYPE: ICD-10-CM

## 2024-03-01 LAB
CLAM IGE QN: <0.1 KU/L
CODFISH IGE QN: <0.1 KU/L
CONV CLASS DESCRIPTION: ABNORMAL
CORN IGE QN: <0.1 KU/L
COW MILK IGE QN: 0.16 KU/L
EGG WHITE IGE QN: 0.12 KU/L
PEANUT IGE QN: 0.1 KU/L
SCALLOP IGE QN: <0.1 KU/L
SESAME SEED IGE QN: <0.1 KU/L
SHRIMP IGE QN: <0.1 KU/L
SOYBEAN IGE QN: <0.1 KU/L
WALNUT IGE QN: <0.1 KU/L
WHEAT IGE QN: <0.1 KU/L

## 2024-03-01 NOTE — TELEPHONE ENCOUNTER
Called pt and advised of Katy NP's note.  Pt verbalized understanding.    Gave pt ph # to central scheduling to get esophagram scheduled.

## 2024-03-01 NOTE — TELEPHONE ENCOUNTER
----- Message from NORA Marques sent at 3/1/2024  8:23 AM EST -----  Please inform the patient food allergy profile shows low positives with cows milk, peanut and egg white.  Maybe of no clinical significance but she could consider elimination diet starting with lactose-free and monitor for symptom improvement.  No an  emia on labs.  Normal kidney and liver function.  Normal thyroid function.  Await imaging.

## 2024-03-01 NOTE — PROGRESS NOTES
Please inform the patient food allergy profile shows low positives with cows milk, peanut and egg white.  Maybe of no clinical significance but she could consider elimination diet starting with lactose-free and monitor for symptom improvement.  No anemia on labs.  Normal kidney and liver function.  Normal thyroid function.  Await imaging.

## 2024-06-04 ENCOUNTER — TELEPHONE (OUTPATIENT)
Dept: GASTROENTEROLOGY | Facility: CLINIC | Age: 49
End: 2024-06-04
Payer: MEDICARE

## 2024-06-04 NOTE — TELEPHONE ENCOUNTER
6/4/24, called pt about esophagram.   Call went to . Advised pt to call to get that scheduled or contact the office to let us know if she wishes to not proceed with testing.

## 2024-09-10 RX ORDER — HYOSCYAMINE SULFATE 0.125 MG
TABLET ORAL
Qty: 90 TABLET | Refills: 5 | Status: SHIPPED | OUTPATIENT
Start: 2024-09-10

## 2024-10-21 DIAGNOSIS — K21.9 GASTROESOPHAGEAL REFLUX DISEASE, UNSPECIFIED WHETHER ESOPHAGITIS PRESENT: ICD-10-CM

## 2024-10-21 RX ORDER — ESOMEPRAZOLE MAGNESIUM 40 MG/1
40 CAPSULE, DELAYED RELEASE ORAL 2 TIMES DAILY
Qty: 60 CAPSULE | Refills: 0 | Status: SHIPPED | OUTPATIENT
Start: 2024-10-21

## 2024-10-23 NOTE — TELEPHONE ENCOUNTER
Hub staff attempted to follow warm transfer process and was unsuccessful     Caller: Cleo Menon    Relationship to patient: Self    Best call back number: [phone]    Patient is needing: PT IS CALLING ABOUT MEDICATION REFILL. IT SAYS THAT IT WAS PLACED ON 10/21/2024 BUT THE PHARMACY IS SAYING IT HAS YET TO BE APPROVED. ATTEMPTED TO WT BUT THE OFFICE WOULD NOT ANSWER.

## 2024-10-25 DIAGNOSIS — K21.9 GASTROESOPHAGEAL REFLUX DISEASE, UNSPECIFIED WHETHER ESOPHAGITIS PRESENT: ICD-10-CM

## 2024-10-28 RX ORDER — ESOMEPRAZOLE MAGNESIUM 40 MG/1
40 CAPSULE, DELAYED RELEASE ORAL 2 TIMES DAILY
Qty: 60 CAPSULE | Refills: 0 | OUTPATIENT
Start: 2024-10-28

## 2024-11-12 ENCOUNTER — OFFICE VISIT (OUTPATIENT)
Dept: GASTROENTEROLOGY | Facility: CLINIC | Age: 49
End: 2024-11-12
Payer: MEDICARE

## 2024-11-12 VITALS
HEART RATE: 108 BPM | BODY MASS INDEX: 31.99 KG/M2 | SYSTOLIC BLOOD PRESSURE: 109 MMHG | HEIGHT: 64 IN | DIASTOLIC BLOOD PRESSURE: 72 MMHG | WEIGHT: 187.4 LBS | TEMPERATURE: 94.8 F

## 2024-11-12 DIAGNOSIS — K58.0 IRRITABLE BOWEL SYNDROME WITH DIARRHEA: Primary | ICD-10-CM

## 2024-11-12 DIAGNOSIS — K21.9 GASTROESOPHAGEAL REFLUX DISEASE, UNSPECIFIED WHETHER ESOPHAGITIS PRESENT: ICD-10-CM

## 2024-11-12 PROCEDURE — 99214 OFFICE O/P EST MOD 30 MIN: CPT | Performed by: NURSE PRACTITIONER

## 2024-11-12 PROCEDURE — 1159F MED LIST DOCD IN RCRD: CPT | Performed by: NURSE PRACTITIONER

## 2024-11-12 PROCEDURE — 1160F RVW MEDS BY RX/DR IN RCRD: CPT | Performed by: NURSE PRACTITIONER

## 2024-11-12 RX ORDER — ESOMEPRAZOLE MAGNESIUM 40 MG/1
40 CAPSULE, DELAYED RELEASE ORAL 2 TIMES DAILY
Qty: 180 CAPSULE | Refills: 3 | Status: SHIPPED | OUTPATIENT
Start: 2024-11-12

## 2024-11-12 NOTE — PROGRESS NOTES
Chief Complaint   Patient presents with    Abdominal Pain       HPI    Cleo Menon is a  49 y.o. female here for a follow up visit for abdominal pain.    Past medical history of gallstone related pancreatitis, lap wai, pulmonary hypertension along with ventral septal defect repaired in .     On visit today she reports significant dyspeptic symptoms with nausea and vomiting after she ran out of Nexium was told it could not be refilled until she was seen in the office by the pharmacy per the patient.  Appetite is poor at the moment.  Her weight is stable.    She still having issues with diarrhea gas and bloating.  Symptoms improved with twice daily dosing colestipol.  She still having supplement with as needed Imodium.  She takes opioids throughout the week for chronic low back pain.  She was treated with Xifaxan roughly 1 year ago with good response.    Additional data reviewed:    EGD and colonoscopy 2023 showed single nonbleeding erosion in the lower third of the esophagus, medium size hiatal hernia, erythematous mucosa in the antrum and normal examined duodenum.  Colonoscopy showed nonbleeding internal hemorrhoids and otherwise normal exam. Biopsies did not show inflammation.      Past Medical History:   Diagnosis Date    Anemia All my life    Cholelithiasis     When i was pregnant  had removed 2004    Coronary artery disease Birth    Had a vsd repair in     Enlarged heart     GERD (gastroesophageal reflux disease)     Pancreatitis     7guallstones stuck in bile duct. Got so sick i also had pancreatitis    PONV (postoperative nausea and vomiting)     Pulmonary hypertension     VSD (ventricular septal defect)     repair done        Past Surgical History:   Procedure Laterality Date    ABDOMINAL SURGERY  C section    [id number]     SECTION      CHOLECYSTECTOMY      COLONOSCOPY N/A 2023    Procedure: COLONOSCOPY to cecum and TI with cold biopsies;  Surgeon: Julian  Mario BRYANT MD;  Location: [facility] ENDOSCOPY;  Service: Gastroenterology;  Laterality: N/A;  pre: abdominal pain, diarrhea  post: diverticulosis, hemorrhoids    CYSTOSCOPY BOTOX INJECTION OF BLADDER      ENDOSCOPY N/A 08/28/2023    Procedure: ESOPHAGOGASTRODUODENOSCOPY with cold biopsies;  Surgeon: Mario Jordan MD;  Location: [facility] ENDOSCOPY;  Service: Gastroenterology;  Laterality: N/A;  pre: nausea, vomitting, abdominal pain  post: hiatal hernia, gastritis, erosive esophagitis    LUMBAR SPINAL CORD TUMOR REMOVAL      TUBAL ABDOMINAL LIGATION  2006    UPPER GASTROINTESTINAL ENDOSCOPY      VSD REPAIR         Scheduled Meds:     Continuous Infusions: No current facility-administered medications for this visit.      PRN Meds:     Allergies   Allergen Reactions    Adhesive Tape Rash    Amoxicillin Hives and Itching    Nsaids GI Bleeding and Other (See Comments)    Wound Dressing Adhesive Rash    Latex Rash and Unknown (See Comments)       Social History     Socioeconomic History    Marital status: Significant Other   Tobacco Use    Smoking status: Never    Smokeless tobacco: Never   Vaping Use    Vaping status: Never Used   Substance and Sexual Activity    Alcohol use: Never    Drug use: Never    Sexual activity: Yes     Partners: Male     Birth control/protection: I.U.D.       History reviewed. No pertinent family history.    Review of Systems   Gastrointestinal:  Positive for abdominal pain and diarrhea.       Vitals:    11/12/24 1016   BP: 109/72   Pulse: 108   Temp: 94.8 °F (34.9 °C)       Physical Exam  Constitutional:       Appearance: She is well-developed.   Abdominal:      General: Bowel sounds are normal. There is no distension.      Palpations: Abdomen is soft. There is no mass.      Tenderness: There is no abdominal tenderness. There is no guarding.      Hernia: No hernia is present.   Skin:     General: Skin is warm and dry.      Capillary Refill: Capillary refill takes less than 2 seconds.    Neurological:      Mental Status: She is alert and oriented to person, place, and time.   Psychiatric:         Behavior: Behavior normal.     Assessment    Diagnoses and all orders for this visit:    1. Irritable bowel syndrome with diarrhea (Primary)    2. Gastroesophageal reflux disease, unspecified whether esophagitis present  -     esomeprazole (nexIUM) 40 MG capsule; Take 1 capsule by mouth 2 (Two) Times a Day.  Dispense: 180 capsule; Refill: 3    Other orders  -     riFAXIMin (Xifaxan) 550 MG tablet; Take 1 tablet by mouth 3 (Three) Times a Day for 14 days.  Dispense: 42 tablet; Refill: 0    Plan    Resume Nexium at twice daily dosing  Follow antireflux diet  Continue Colestid  Give Xifaxan 1 tablet 3 times a day x 14 days to treat residual IBS-D symptoms  Follow-up 3 months or sooner if needed         NORA Marques  [facility]  [address] [place] [address]  Office: [phone]

## 2024-11-15 ENCOUNTER — TELEPHONE (OUTPATIENT)
Dept: GASTROENTEROLOGY | Facility: CLINIC | Age: 49
End: 2024-11-15
Payer: MEDICARE

## 2024-11-15 NOTE — TELEPHONE ENCOUNTER
Message from patient via my chart:     I keep trying to call the office about my heartburn medicine and it just rings and then hangs up. Anyway the pharmacy is saying I need a prior authorization for it. Before they will fill it. Hope you are having a good day. Thanks for your help.

## 2024-11-21 ENCOUNTER — TELEPHONE (OUTPATIENT)
Dept: GASTROENTEROLOGY | Facility: CLINIC | Age: 49
End: 2024-11-21
Payer: MEDICARE

## 2024-11-21 NOTE — TELEPHONE ENCOUNTER
Message from patient via my chart:     My prescription still hasn’t been filled. They keep saying needs pre authorization needed. I’m not sure what to do. I’ve been out of this medication over 2 months and Katy said to call and let yall know if there is an issue and I have and it disconnects me because it rings too much. I really appreciate you helping me figure it out. The pharmacist said they sent over a pre authorization for it.

## 2024-11-22 NOTE — TELEPHONE ENCOUNTER
PA submitted today.    Outcome: Approved today by [employer] 2017  PA Case: [id number], Status: Approved, Coverage Starts on: 8/23/2024 12:00:00 AM, Coverage Ends on: 11/22/2025 12:00:00 AM.  Authorization Expiration Date: 11/21/2025

## 2025-03-04 RX ORDER — COLESTIPOL HYDROCHLORIDE 1 G/1
1 TABLET ORAL 2 TIMES DAILY
Qty: 180 TABLET | Refills: 3 | Status: SHIPPED | OUTPATIENT
Start: 2025-03-04

## 2025-04-16 ENCOUNTER — TELEPHONE (OUTPATIENT)
Dept: GASTROENTEROLOGY | Facility: CLINIC | Age: 50
End: 2025-04-16

## 2025-04-16 ENCOUNTER — TELEPHONE (OUTPATIENT)
Dept: GASTROENTEROLOGY | Facility: CLINIC | Age: 50
End: 2025-04-16
Payer: MEDICARE

## 2025-04-16 NOTE — TELEPHONE ENCOUNTER
staff attempted to follow warm transfer process and was unsuccessful     Caller: JEFF MCCAULEY    Relationship to patient: SELF    Best call back number: [phone]    Patient is needing: PT ATTEMPTING TO CALL BACK STELLA. UNABLE TO WT. PLEASE SEE PREVIOUS TE.

## 2025-04-16 NOTE — TELEPHONE ENCOUNTER
Called the pt to reschedule her appt with Katy tomorrow 4/17 as Katy will be out in the morning.  I did schedule her for 6/4 but she is wanting to know if she can switch her heartburn medication.  Please call the pt back.

## 2025-04-16 NOTE — TELEPHONE ENCOUNTER
Called pt, she would like to see if esomeprazole can be changed to another medication.     Pt is having nighttime reflux sx and morning nausea.  States Nexium is not controlling her sx.      Advised will send a msg to RYAN Burns.

## 2025-04-23 RX ORDER — PANTOPRAZOLE SODIUM 40 MG/1
40 TABLET, DELAYED RELEASE ORAL 2 TIMES DAILY
Qty: 180 TABLET | Refills: 3 | Status: SHIPPED | OUTPATIENT
Start: 2025-04-23

## 2025-06-04 ENCOUNTER — TELEPHONE (OUTPATIENT)
Dept: GASTROENTEROLOGY | Facility: CLINIC | Age: 50
End: 2025-06-04
Payer: MEDICARE

## 2025-06-04 ENCOUNTER — OFFICE VISIT (OUTPATIENT)
Dept: GASTROENTEROLOGY | Facility: CLINIC | Age: 50
End: 2025-06-04
Payer: MEDICARE

## 2025-06-04 VITALS
HEART RATE: 85 BPM | SYSTOLIC BLOOD PRESSURE: 96 MMHG | DIASTOLIC BLOOD PRESSURE: 66 MMHG | BODY MASS INDEX: 30.56 KG/M2 | TEMPERATURE: 97.7 F | HEIGHT: 64 IN | WEIGHT: 179 LBS

## 2025-06-04 DIAGNOSIS — R10.13 DYSPEPSIA: ICD-10-CM

## 2025-06-04 DIAGNOSIS — K58.0 IRRITABLE BOWEL SYNDROME WITH DIARRHEA: ICD-10-CM

## 2025-06-04 DIAGNOSIS — R10.13 EPIGASTRIC PAIN: Primary | ICD-10-CM

## 2025-06-04 DIAGNOSIS — R79.89 ELEVATED LIVER FUNCTION TESTS: ICD-10-CM

## 2025-06-04 DIAGNOSIS — R68.81 EARLY SATIETY: ICD-10-CM

## 2025-06-04 PROCEDURE — 1160F RVW MEDS BY RX/DR IN RCRD: CPT | Performed by: NURSE PRACTITIONER

## 2025-06-04 PROCEDURE — 1159F MED LIST DOCD IN RCRD: CPT | Performed by: NURSE PRACTITIONER

## 2025-06-04 PROCEDURE — 99214 OFFICE O/P EST MOD 30 MIN: CPT | Performed by: NURSE PRACTITIONER

## 2025-06-04 RX ORDER — SYRINGE WITH NEEDLE, 1 ML 25GX5/8"
SYRINGE, EMPTY DISPOSABLE MISCELLANEOUS
COMMUNITY
Start: 2025-04-30

## 2025-06-04 RX ORDER — CYANOCOBALAMIN 1000 UG/ML
INJECTION, SOLUTION INTRAMUSCULAR; SUBCUTANEOUS
COMMUNITY
Start: 2025-04-23

## 2025-06-04 RX ORDER — SOTATERCEPT-CSRK 60 MG
KIT SUBCUTANEOUS
COMMUNITY
Start: 2025-05-29

## 2025-06-04 RX ORDER — LOPERAMIDE HYDROCHLORIDE 2 MG/1
2 TABLET ORAL 2 TIMES DAILY PRN
Qty: 180 TABLET | Refills: 3 | Status: SHIPPED | OUTPATIENT
Start: 2025-06-04

## 2025-06-04 RX ORDER — FAMOTIDINE 20 MG/1
TABLET, FILM COATED ORAL
COMMUNITY
Start: 2025-02-05

## 2025-06-04 RX ORDER — IBUPROFEN 600 MG/1
600 TABLET, FILM COATED ORAL
COMMUNITY

## 2025-06-04 RX ORDER — ATORVASTATIN CALCIUM 40 MG/1
40 TABLET, FILM COATED ORAL DAILY
COMMUNITY
Start: 2025-03-29

## 2025-06-04 NOTE — PROGRESS NOTES
Chief Complaint   Patient presents with    Heartburn    Diarrhea       HPI    Cleo Menon is a  50 y.o. female here for a follow up visit for GERD and irritable bowel syndrome.     Past medical history of gallstone related pancreatitis, lap wai, pulmonary hypertension along with ventral septal defect repaired in 1981.     On visit today she reports return of epigastric pain and dyspepsia.  We change her PPI therapy to Protonix to twice daily dosing roughly 1 month ago with some improvement in symptoms.  Associated nausea and early satiety without vomiting, dysphagia or odynophagia. Appetites been good.  Weight has been stable.  BMI 31.11.    She still struggles with IBS-D.  We have tried her on colestipol with minimal improvement.  She felt the best on Imodium.  We treated with her rifaximin in the past which did help considerably.  No rectal symptoms.    Recent lab work reviewed with no evidence of anemia.  She does take B12 and iron supplements via her primary care provider.  Liver function test mildly elevated but notation from the lab reports hemolyzed specimen.     Endoscopic history reviewed:    UPPER GI ENDOSCOPY (08/28/2023 15:47) - Single nonbleeding erosion in the lower third of the esophagus.  Medium size hiatal hernia.  Erythematous mucosa antrum.  Normal duodenum.  Pathology was benign.    COLONOSCOPY (08/28/2023 15:45) - Normal ileum and hemorrhoids.  Recall 10 years.  Pathology was benign.    Past Medical History:   Diagnosis Date    Anemia All my life    Cholelithiasis     When i was pregnant 2003 had removed 2004    Coronary artery disease Birth    Had a vsd repair in 1981    Enlarged heart     GERD (gastroesophageal reflux disease)     Pancreatitis 2004    7guallstones stuck in bile duct. Got so sick i also had pancreatitis    PONV (postoperative nausea and vomiting)     Pulmonary hypertension     VSD (ventricular septal defect)     repair done 1981       Past Surgical History:   Procedure  Laterality Date    ABDOMINAL SURGERY  C section    [id number]     SECTION      CHOLECYSTECTOMY      COLONOSCOPY N/A 2023    Procedure: COLONOSCOPY to cecum and TI with cold biopsies;  Surgeon: Mario Jordan MD;  Location: [facility] ENDOSCOPY;  Service: Gastroenterology;  Laterality: N/A;  pre: abdominal pain, diarrhea  post: diverticulosis, hemorrhoids    CYSTOSCOPY BOTOX INJECTION OF BLADDER      ENDOSCOPY N/A 2023    Procedure: ESOPHAGOGASTRODUODENOSCOPY with cold biopsies;  Surgeon: Mario Jordan MD;  Location: [facility] ENDOSCOPY;  Service: Gastroenterology;  Laterality: N/A;  pre: nausea, vomitting, abdominal pain  post: hiatal hernia, gastritis, erosive esophagitis    LUMBAR SPINAL CORD TUMOR REMOVAL      TUBAL ABDOMINAL LIGATION      UPPER GASTROINTESTINAL ENDOSCOPY      VSD REPAIR         Scheduled Meds:     Continuous Infusions: No current facility-administered medications for this visit.      PRN Meds:     Allergies   Allergen Reactions    Adhesive Tape Rash    Amoxicillin Hives and Itching    Nsaids GI Bleeding and Other (See Comments)    Wound Dressing Adhesive Rash    Egg White (Egg Protein) GI Intolerance    Milk (Cow) GI Intolerance    Peanut-Containing Drug Products Other (See Comments)     Swelling in mouth and turns face red    Latex Rash and Unknown (See Comments)       Social History     Socioeconomic History    Marital status: Significant Other   Tobacco Use    Smoking status: Never    Smokeless tobacco: Never   Vaping Use    Vaping status: Never Used   Substance and Sexual Activity    Alcohol use: Never    Drug use: Never    Sexual activity: Yes     Partners: Male     Birth control/protection: I.U.D.       History reviewed. No pertinent family history.    Vitals:    25 0954   BP: 96/66   Pulse: 85   Temp: 97.7 °F (36.5 °C)       Physical Exam  Constitutional:       Appearance: She is well-developed.   Abdominal:      General: Bowel sounds are normal.  There is no distension.      Palpations: Abdomen is soft. There is no mass.      Tenderness: There is no abdominal tenderness. There is no guarding.      Hernia: No hernia is present.   Skin:     General: Skin is warm and dry.      Capillary Refill: Capillary refill takes less than 2 seconds.   Neurological:      Mental Status: She is alert and oriented to person, place, and time.   Psychiatric:         Behavior: Behavior normal.     Assessment    Diagnoses and all orders for this visit:    1. Epigastric pain (Primary)  -     Case Request; Standing  -     Follow Anesthesia Guidelines / Protocol; Future  -     Obtain Informed Consent; Standing  -     Case Request    2. Dyspepsia  -     Case Request; Standing  -     Follow Anesthesia Guidelines / Protocol; Future  -     Obtain Informed Consent; Standing  -     Case Request    3. Early satiety  -     Case Request; Standing  -     Follow Anesthesia Guidelines / Protocol; Future  -     Obtain Informed Consent; Standing  -     Case Request    4. Irritable bowel syndrome with diarrhea    5. Elevated liver function tests  -     Comprehensive Metabolic Panel; Future    Other orders  -     riFAXIMin (Xifaxan) 550 MG tablet; Take 1 tablet by mouth 3 (Three) Times a Day for 14 days.  Dispense: 42 tablet; Refill: 0  -     loperamide (Imodium A-D) 2 MG tablet; Take 1 tablet by mouth 2 (Two) Times a Day As Needed for Diarrhea.  Dispense: 180 tablet; Refill: 3    Plan    Schedule EGD with Dr. Farooq for further evaluation of symptoms  Continue twice daily dosing Protonix  Antireflux measures and dietary modifications reviewed. Low acid diet reviewed. Keep head of bed elevated. Stop eating/drinking at least 3 hours prior to bedtime. Eliminate caffeine and carbonated beverages.    Stop colestipol patient has not found it all that helpful  Continue current Imodium regimen  Give 14 days of Xifaxan as above to improve irritable bowel symptoms  Repeat CMP in 2 weeks for reassessment of  mildly elevated LFTs  Await endoscopic findings for further recommendations and follow-up         NORA Marques  [facility]  [facility] [place] [address]  Office: [phone]

## (undated) DEVICE — FRCP BX RADJAW4 NDL 2.8 240CM LG OG BX40

## (undated) DEVICE — ADAPT CLN BIOGUARD AIR/H2O DISP

## (undated) DEVICE — KT ORCA ORCAPOD DISP STRL

## (undated) DEVICE — LN SMPL CO2 SHTRM SD STREAM W/M LUER

## (undated) DEVICE — SENSR O2 OXIMAX FNGR A/ 18IN NONSTR

## (undated) DEVICE — TUBING, SUCTION, 1/4" X 10', STRAIGHT: Brand: MEDLINE

## (undated) DEVICE — BLCK/BITE BLOX W/DENTL/RIM W/STRAP 54F

## (undated) DEVICE — MSK ENDO PORT O2 POM ELITE CURAPLEX A/